# Patient Record
Sex: MALE | Race: WHITE | NOT HISPANIC OR LATINO
[De-identification: names, ages, dates, MRNs, and addresses within clinical notes are randomized per-mention and may not be internally consistent; named-entity substitution may affect disease eponyms.]

---

## 2017-10-19 ENCOUNTER — RECORD ABSTRACTING (OUTPATIENT)
Age: 42
End: 2017-10-19

## 2017-10-19 DIAGNOSIS — Z80.3 FAMILY HISTORY OF MALIGNANT NEOPLASM OF BREAST: ICD-10-CM

## 2017-10-19 DIAGNOSIS — Z83.49 FAMILY HISTORY OF OTHER ENDOCRINE, NUTRITIONAL AND METABOLIC DISEASES: ICD-10-CM

## 2017-10-19 DIAGNOSIS — Z78.9 OTHER SPECIFIED HEALTH STATUS: ICD-10-CM

## 2017-10-19 DIAGNOSIS — Z82.3 FAMILY HISTORY OF STROKE: ICD-10-CM

## 2017-10-19 DIAGNOSIS — Z82.49 FAMILY HISTORY OF ISCHEMIC HEART DISEASE AND OTHER DISEASES OF THE CIRCULATORY SYSTEM: ICD-10-CM

## 2017-11-09 ENCOUNTER — APPOINTMENT (OUTPATIENT)
Dept: FAMILY MEDICINE | Facility: CLINIC | Age: 42
End: 2017-11-09

## 2018-02-01 ENCOUNTER — APPOINTMENT (OUTPATIENT)
Dept: FAMILY MEDICINE | Facility: CLINIC | Age: 43
End: 2018-02-01
Payer: COMMERCIAL

## 2018-02-01 VITALS
WEIGHT: 210 LBS | DIASTOLIC BLOOD PRESSURE: 110 MMHG | BODY MASS INDEX: 32.96 KG/M2 | HEIGHT: 67 IN | SYSTOLIC BLOOD PRESSURE: 150 MMHG

## 2018-02-01 PROCEDURE — 99211 OFF/OP EST MAY X REQ PHY/QHP: CPT

## 2018-02-15 ENCOUNTER — APPOINTMENT (OUTPATIENT)
Dept: FAMILY MEDICINE | Facility: CLINIC | Age: 43
End: 2018-02-15
Payer: COMMERCIAL

## 2018-02-15 VITALS
HEIGHT: 67 IN | WEIGHT: 210 LBS | BODY MASS INDEX: 32.96 KG/M2 | DIASTOLIC BLOOD PRESSURE: 100 MMHG | SYSTOLIC BLOOD PRESSURE: 150 MMHG

## 2018-02-15 DIAGNOSIS — Z82.49 FAMILY HISTORY OF ISCHEMIC HEART DISEASE AND OTHER DISEASES OF THE CIRCULATORY SYSTEM: ICD-10-CM

## 2018-02-15 PROCEDURE — 99213 OFFICE O/P EST LOW 20 MIN: CPT

## 2018-03-15 ENCOUNTER — APPOINTMENT (OUTPATIENT)
Dept: FAMILY MEDICINE | Facility: CLINIC | Age: 43
End: 2018-03-15

## 2018-03-17 ENCOUNTER — APPOINTMENT (OUTPATIENT)
Dept: FAMILY MEDICINE | Facility: CLINIC | Age: 43
End: 2018-03-17

## 2018-08-02 ENCOUNTER — APPOINTMENT (OUTPATIENT)
Dept: FAMILY MEDICINE | Facility: CLINIC | Age: 43
End: 2018-08-02
Payer: COMMERCIAL

## 2018-08-02 VITALS
BODY MASS INDEX: 32.96 KG/M2 | DIASTOLIC BLOOD PRESSURE: 100 MMHG | HEIGHT: 67 IN | WEIGHT: 210 LBS | SYSTOLIC BLOOD PRESSURE: 178 MMHG

## 2018-08-02 LAB — HBA1C MFR BLD: 8.3

## 2018-08-02 PROCEDURE — 99213 OFFICE O/P EST LOW 20 MIN: CPT

## 2018-08-02 RX ORDER — LISINOPRIL AND HYDROCHLOROTHIAZIDE TABLETS 20; 12.5 MG/1; MG/1
20-12.5 TABLET ORAL
Qty: 30 | Refills: 0 | Status: DISCONTINUED | COMMUNITY
End: 2018-08-02

## 2018-08-02 NOTE — PLAN
[FreeTextEntry1] : Stressed the need to take meds every day. Needs to lose weight. Needs to exercise. Decrease carbs and the salt.

## 2018-08-02 NOTE — PHYSICAL EXAM
[No Respiratory Distress] : no respiratory distress  [Clear to Auscultation] : lungs were clear to auscultation bilaterally [No Accessory Muscle Use] : no accessory muscle use [Normal Rate] : normal rate  [Regular Rhythm] : with a regular rhythm [Normal S1, S2] : normal S1 and S2 [No Murmur] : no murmur heard

## 2018-08-02 NOTE — HISTORY OF PRESENT ILLNESS
[Hypertension] : Hypertension [Checks BP Sporadically] : The patient checks ~his/her~ blood pressure sporadically [<140/90] : Target blood pressure is <140/90 [Target goal not met] : BP target goal not met [FreeTextEntry6] : BP check.

## 2018-08-02 NOTE — HEALTH RISK ASSESSMENT
[No falls in past year] : Patient reported no falls in the past year [0] : 2) Feeling down, depressed, or hopeless: Not at all (0) [] : No [de-identified] : rarely

## 2018-08-02 NOTE — COUNSELING
[Weight management counseling provided] : Weight management [Healthy eating counseling provided] : healthy eating [Activity counseling provided] : activity [Behavioral health counseling provided] : behavioral health  [Engage in a relaxing activity] : Engage in a relaxing activity [Patient Non-adherent to care plan] : Patient non-adherent to care plan [Good understanding] : Patient has a good understanding of lifestyle changes and the steps needed to achieve self management goals

## 2018-08-16 ENCOUNTER — APPOINTMENT (OUTPATIENT)
Dept: FAMILY MEDICINE | Facility: CLINIC | Age: 43
End: 2018-08-16

## 2018-11-02 ENCOUNTER — APPOINTMENT (OUTPATIENT)
Dept: FAMILY MEDICINE | Facility: CLINIC | Age: 43
End: 2018-11-02

## 2018-11-12 ENCOUNTER — RX RENEWAL (OUTPATIENT)
Age: 43
End: 2018-11-12

## 2018-11-29 ENCOUNTER — APPOINTMENT (OUTPATIENT)
Dept: FAMILY MEDICINE | Facility: CLINIC | Age: 43
End: 2018-11-29
Payer: COMMERCIAL

## 2018-11-29 VITALS
BODY MASS INDEX: 32.96 KG/M2 | HEIGHT: 67 IN | DIASTOLIC BLOOD PRESSURE: 106 MMHG | SYSTOLIC BLOOD PRESSURE: 142 MMHG | WEIGHT: 210 LBS

## 2018-11-29 PROCEDURE — 99213 OFFICE O/P EST LOW 20 MIN: CPT

## 2018-11-29 NOTE — COUNSELING
[Weight management counseling provided] : Weight management [Healthy eating counseling provided] : healthy eating [Activity counseling provided] : activity [Behavioral health counseling provided] : behavioral health  [Low Salt Diet] : Low salt diet [None] : None [Good understanding] : Patient has a good understanding of lifestyle changes and the steps needed to achieve self management goals

## 2018-11-29 NOTE — HISTORY OF PRESENT ILLNESS
[Hypertension] : Hypertension [Does not check BP] : The patient is not checking blood pressure [<140/90] : Target blood pressure is <140/90 [Target goal not met] : BP target goal not met [FreeTextEntry6] : BP check. and refill of medications

## 2018-12-20 ENCOUNTER — APPOINTMENT (OUTPATIENT)
Dept: FAMILY MEDICINE | Facility: CLINIC | Age: 43
End: 2018-12-20

## 2018-12-27 ENCOUNTER — APPOINTMENT (OUTPATIENT)
Dept: FAMILY MEDICINE | Facility: CLINIC | Age: 43
End: 2018-12-27
Payer: COMMERCIAL

## 2018-12-27 VITALS
DIASTOLIC BLOOD PRESSURE: 86 MMHG | SYSTOLIC BLOOD PRESSURE: 136 MMHG | BODY MASS INDEX: 32.96 KG/M2 | WEIGHT: 210 LBS | HEIGHT: 67 IN

## 2018-12-27 PROCEDURE — G0447 BEHAVIOR COUNSEL OBESITY 15M: CPT

## 2018-12-27 PROCEDURE — 99214 OFFICE O/P EST MOD 30 MIN: CPT | Mod: 25

## 2018-12-27 NOTE — COUNSELING
[Weight management counseling provided] : Weight management [Healthy eating counseling provided] : healthy eating [Activity counseling provided] : activity [Behavioral health counseling provided] : behavioral health  [None] : None [Good understanding] : Patient has a good understanding of lifestyle changes and the steps needed to achieve self management goals [ - Behavioral Counseling for Obesity (Face-to-Face for 15 Minutes)] : Behavioral Counseling for Obesity (Face-to-Face for 15 Minutes)

## 2018-12-27 NOTE — HISTORY OF PRESENT ILLNESS
[Hypertension] : Hypertension [Does not check BP] : The patient is not checking blood pressure [<140/90] : Target blood pressure is <140/90 [FreeTextEntry6] : BP check.

## 2018-12-27 NOTE — HEALTH RISK ASSESSMENT
[No falls in past year] : Patient reported no falls in the past year [0] : 2) Feeling down, depressed, or hopeless: Not at all (0) [] : No [[id number]] : 0

## 2019-03-28 ENCOUNTER — APPOINTMENT (OUTPATIENT)
Dept: FAMILY MEDICINE | Facility: CLINIC | Age: 44
End: 2019-03-28
Payer: COMMERCIAL

## 2019-03-28 VITALS
HEIGHT: 67 IN | WEIGHT: 210 LBS | SYSTOLIC BLOOD PRESSURE: 118 MMHG | DIASTOLIC BLOOD PRESSURE: 80 MMHG | BODY MASS INDEX: 32.96 KG/M2

## 2019-03-28 PROCEDURE — 99213 OFFICE O/P EST LOW 20 MIN: CPT

## 2019-03-28 NOTE — HISTORY OF PRESENT ILLNESS
[Hypertension] : Hypertension [FreeTextEntry6] : BP check. [Checks BP Sporadically] : The patient checks ~his/her~ blood pressure sporadically [<140/90] : Target blood pressure is <140/90 [Near target goal] : BP near target goal

## 2019-03-28 NOTE — HEALTH RISK ASSESSMENT
[] : No [No falls in past year] : Patient reported no falls in the past year [0] : 2) Feeling down, depressed, or hopeless: Not at all (0) [[id number]] : 0

## 2019-07-10 ENCOUNTER — APPOINTMENT (OUTPATIENT)
Dept: FAMILY MEDICINE | Facility: CLINIC | Age: 44
End: 2019-07-10

## 2019-08-08 ENCOUNTER — NON-APPOINTMENT (OUTPATIENT)
Age: 44
End: 2019-08-08

## 2019-08-08 ENCOUNTER — APPOINTMENT (OUTPATIENT)
Dept: FAMILY MEDICINE | Facility: CLINIC | Age: 44
End: 2019-08-08
Payer: COMMERCIAL

## 2019-08-08 VITALS
BODY MASS INDEX: 36.1 KG/M2 | HEIGHT: 67 IN | WEIGHT: 230 LBS | DIASTOLIC BLOOD PRESSURE: 80 MMHG | SYSTOLIC BLOOD PRESSURE: 128 MMHG

## 2019-08-08 LAB
BILIRUB UR QL STRIP: NORMAL
CLARITY UR: CLEAR
COLLECTION METHOD: NORMAL
GLUCOSE UR-MCNC: 0
HCG UR QL: 0.2 EU/DL
HGB UR QL STRIP.AUTO: 0
KETONES UR-MCNC: 0
LEUKOCYTE ESTERASE UR QL STRIP: 0
NITRITE UR QL STRIP: 0
PH UR STRIP: 5.5
PROT UR STRIP-MCNC: 0
SP GR UR STRIP: 1.03

## 2019-08-08 PROCEDURE — 92551 PURE TONE HEARING TEST AIR: CPT

## 2019-08-08 PROCEDURE — 36415 COLL VENOUS BLD VENIPUNCTURE: CPT

## 2019-08-08 PROCEDURE — 99396 PREV VISIT EST AGE 40-64: CPT | Mod: 25

## 2019-08-08 PROCEDURE — 99173 VISUAL ACUITY SCREEN: CPT

## 2019-08-08 PROCEDURE — 93000 ELECTROCARDIOGRAM COMPLETE: CPT

## 2019-08-08 PROCEDURE — 81002 URINALYSIS NONAUTO W/O SCOPE: CPT

## 2019-08-08 NOTE — PHYSICAL EXAM
[20/___] : left eye 20/[unfilled] [FreeTextEntry1] : 500 R 15 L 15   1000 R 20 L 15   2000 R 15 L 15   4000 R 25 L 50

## 2019-08-08 NOTE — COUNSELING
[Fall prevention counseling provided] : Fall prevention counseling provided [Behavioral health counseling provided] : Behavioral health counseling provided

## 2019-08-08 NOTE — HEALTH RISK ASSESSMENT
[0] : 2) Feeling down, depressed, or hopeless: Not at all (0) [Very Good] : ~his/her~  mood as very good [] : No [Yes] : Yes [Monthly or less (1 pt)] : Monthly or less (1 point) [1 or 2 (0 pts)] : 1 or 2 (0 points) [No] : In the past 12 months have you used drugs other than those required for medical reasons? No [Never (0 pts)] : Never (0 points) [No falls in past year] : Patient reported no falls in the past year [[id number]] : 0 [Audit-CScore] : 1 [No Retinopathy] : No retinopathy [EyeExamDate] : 08/19 [HIV test declined] : HIV test declined [Hepatitis C test declined] : Hepatitis C test declined [Language] : denies difficulty with language [Change in mental status noted] : No change in mental status noted [Learning/Retaining New Information] : denies difficulty learning/retaining new information [Reasoning] : denies difficulty with reasoning [Handling Complex Tasks] : denies difficulty handling complex tasks [Spatial Ability and Orientation] : denies difficulty with spatial ability and orientation [None] : None [With Family] : lives with family [Employed] : employed [# Of Children ___] : has [unfilled] children [] :  [High School] : high school [High Risk Behavior] : no high risk behavior [Sexually Active] : sexually active [Fully functional (bathing, dressing, toileting, transferring, walking, feeding)] : Fully functional (bathing, dressing, toileting, transferring, walking, feeding) [Feels Safe at Home] : Feels safe at home [Fully functional (using the telephone, shopping, preparing meals, housekeeping, doing laundry, using] : Fully functional and needs no help or supervision to perform IADLs (using the telephone, shopping, preparing meals, housekeeping, doing laundry, using transportation, managing medications and managing finances) [Reports changes in hearing] : Reports no changes in hearing [Reports changes in dental health] : Reports no changes in dental health [Reports changes in vision] : Reports no changes in vision [Carbon Monoxide Detector] : carbon monoxide detector [Smoke Detector] : smoke detector [Safety elements used in home] : safety elements used in home [Guns at Home] : guns at home [Sunscreen] : does not use sunscreen [Seat Belt] :  uses seat belt [de-identified] : Locked [With Patient/Caregiver] : With Patient/Caregiver [AdvancecareDate] : 08/19 [Designated Healthcare Proxy] : Designated healthcare proxy

## 2019-08-09 LAB
ALBUMIN SERPL ELPH-MCNC: 4.7 G/DL
ALP BLD-CCNC: 49 U/L
ALT SERPL-CCNC: 42 U/L
ANION GAP SERPL CALC-SCNC: 12 MMOL/L
AST SERPL-CCNC: 15 U/L
BASOPHILS # BLD AUTO: 0.09 K/UL
BASOPHILS NFR BLD AUTO: 0.9 %
BILIRUB SERPL-MCNC: 0.4 MG/DL
BUN SERPL-MCNC: 13 MG/DL
CALCIUM SERPL-MCNC: 9.6 MG/DL
CHLORIDE SERPL-SCNC: 104 MMOL/L
CO2 SERPL-SCNC: 25 MMOL/L
CREAT SERPL-MCNC: 1.17 MG/DL
CREAT SPEC-SCNC: 217 MG/DL
EOSINOPHIL # BLD AUTO: 0.21 K/UL
EOSINOPHIL NFR BLD AUTO: 2.2 %
GLUCOSE SERPL-MCNC: 161 MG/DL
HCT VFR BLD CALC: 46.2 %
HGB BLD-MCNC: 15.7 G/DL
IMM GRANULOCYTES NFR BLD AUTO: 0.3 %
LYMPHOCYTES # BLD AUTO: 3.05 K/UL
LYMPHOCYTES NFR BLD AUTO: 31.3 %
MAN DIFF?: NORMAL
MCHC RBC-ENTMCNC: 32 PG
MCHC RBC-ENTMCNC: 34 GM/DL
MCV RBC AUTO: 94.3 FL
MICROALBUMIN 24H UR DL<=1MG/L-MCNC: <1.2 MG/DL
MICROALBUMIN/CREAT 24H UR-RTO: NORMAL MG/G
MONOCYTES # BLD AUTO: 0.79 K/UL
MONOCYTES NFR BLD AUTO: 8.1 %
NEUTROPHILS # BLD AUTO: 5.59 K/UL
NEUTROPHILS NFR BLD AUTO: 57.2 %
PLATELET # BLD AUTO: 242 K/UL
POTASSIUM SERPL-SCNC: 3.9 MMOL/L
PROT SERPL-MCNC: 7.6 G/DL
RBC # BLD: 4.9 M/UL
RBC # FLD: 12.9 %
SODIUM SERPL-SCNC: 141 MMOL/L
WBC # FLD AUTO: 9.76 K/UL

## 2019-08-14 LAB
CHOLEST SERPL-MCNC: 212 MG/DL
CHOLEST/HDLC SERPL: 5.6 RATIO
ESTIMATED AVERAGE GLUCOSE: 214 MG/DL
HBA1C MFR BLD HPLC: 9.1 %
HDLC SERPL-MCNC: 38 MG/DL
LDLC SERPL CALC-MCNC: 154 MG/DL
TRIGL SERPL-MCNC: 102 MG/DL

## 2019-08-30 ENCOUNTER — RX RENEWAL (OUTPATIENT)
Age: 44
End: 2019-08-30

## 2019-10-10 ENCOUNTER — APPOINTMENT (OUTPATIENT)
Dept: FAMILY MEDICINE | Facility: CLINIC | Age: 44
End: 2019-10-10
Payer: COMMERCIAL

## 2019-10-10 VITALS
WEIGHT: 230 LBS | DIASTOLIC BLOOD PRESSURE: 98 MMHG | SYSTOLIC BLOOD PRESSURE: 164 MMHG | HEIGHT: 67 IN | BODY MASS INDEX: 36.1 KG/M2

## 2019-10-10 PROCEDURE — 99214 OFFICE O/P EST MOD 30 MIN: CPT

## 2019-10-10 NOTE — PLAN
[FreeTextEntry1] : Advised at length regarding risks and benefits including life threatening complications and risk of injury. Strongly advised not to use it while flying or driving or operating machinery. Advised I will not prescribe it off label and recommended he stop using it.\par ALSO MUST TAKE BP MEDS!

## 2019-10-10 NOTE — HISTORY OF PRESENT ILLNESS
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 Purchased some off the black market. Liked the way he felt taking it.

## 2019-11-06 ENCOUNTER — APPOINTMENT (OUTPATIENT)
Dept: FAMILY MEDICINE | Facility: CLINIC | Age: 44
End: 2019-11-06

## 2020-04-06 ENCOUNTER — APPOINTMENT (OUTPATIENT)
Dept: FAMILY MEDICINE | Facility: CLINIC | Age: 45
End: 2020-04-06
Payer: COMMERCIAL

## 2020-04-06 PROCEDURE — G2012 BRIEF CHECK IN BY MD/QHP: CPT

## 2020-04-22 ENCOUNTER — APPOINTMENT (OUTPATIENT)
Dept: FAMILY MEDICINE | Facility: CLINIC | Age: 45
End: 2020-04-22

## 2020-04-22 ENCOUNTER — APPOINTMENT (OUTPATIENT)
Dept: FAMILY MEDICINE | Facility: CLINIC | Age: 45
End: 2020-04-22
Payer: COMMERCIAL

## 2020-04-22 PROCEDURE — 99213 OFFICE O/P EST LOW 20 MIN: CPT | Mod: 95

## 2020-04-22 NOTE — PHYSICAL EXAM
[Normal Outer Ear/Nose] : the outer ears and nose were normal in appearance [Normal Sclera/Conjunctiva] : normal sclera/conjunctiva [No JVD] : no jugular venous distention [No Respiratory Distress] : no respiratory distress  [Supple] : supple [Normal] : no joint swelling and grossly normal strength and tone [de-identified] : Unable to examine due to telehealth visit

## 2020-04-22 NOTE — HISTORY OF PRESENT ILLNESS
[FreeTextEntry8] : Pt believes that he and his family all had the symptoms of COVID -19. The patient had an exposure to covid 19\par Pt had diarrhea, fever,  fatigue, weakness, chills, nausea, \par Son had loss of smell and taste. WIfe had cough, and shortness of breath Had a chest xray which was clear with fever.\par At this point most of family is better except for his wife who is 90 % better.\par \par Pt has been checking his BP  occ. last level was 138/89\par

## 2020-04-22 NOTE — PLAN
[FreeTextEntry1] : Advised at length\par \par Advises of criteria for return to work after COVID 19 infection:\par At least 7 days since onset of symptoms\par At least 3 days since resolution of fever without antipyretics\par Substantial improvement of symptoms and cough mostly resolved\par

## 2020-06-12 ENCOUNTER — APPOINTMENT (OUTPATIENT)
Dept: FAMILY MEDICINE | Facility: CLINIC | Age: 45
End: 2020-06-12
Payer: COMMERCIAL

## 2020-06-12 PROCEDURE — 99213 OFFICE O/P EST LOW 20 MIN: CPT | Mod: 95

## 2020-06-12 NOTE — PHYSICAL EXAM
[Normal Sclera/Conjunctiva] : normal sclera/conjunctiva [Normal Outer Ear/Nose] : the outer ears and nose were normal in appearance [No JVD] : no jugular venous distention [Normal] : soft, non-tender, non-distended, no masses palpated, no HSM and normal bowel sounds [No Respiratory Distress] : no respiratory distress  [de-identified] : Unable to examine due to telehealth visit

## 2020-06-12 NOTE — HEALTH RISK ASSESSMENT
[Yes] : Yes [Monthly or less (1 pt)] : Monthly or less (1 point) [No falls in past year] : Patient reported no falls in the past year [1 or 2 (0 pts)] : 1 or 2 (0 points) [0] : 1) Little interest or pleasure doing things: Not at all (0) [] : No [GQC2Pvsqq] : 0

## 2020-06-12 NOTE — HISTORY OF PRESENT ILLNESS
[Medical Office: ([facility])___] : at the medical office located in  [Home] : at home, [unfilled] , at the time of the visit. [Verbal consent obtained from patient] : the patient, [unfilled] [Checks BP Sporadically] : The patient checks ~his/her~ blood pressure sporadically [<140/90] : Target blood pressure is <140/90 [Near target goal] : BP near target goal [FreeTextEntry4] : Marilynn Meng [de-identified] : 523/89

## 2020-10-22 ENCOUNTER — APPOINTMENT (OUTPATIENT)
Dept: FAMILY MEDICINE | Facility: CLINIC | Age: 45
End: 2020-10-22
Payer: COMMERCIAL

## 2020-10-22 VITALS
HEART RATE: 103 BPM | WEIGHT: 230 LBS | TEMPERATURE: 98.2 F | SYSTOLIC BLOOD PRESSURE: 140 MMHG | BODY MASS INDEX: 36.02 KG/M2 | DIASTOLIC BLOOD PRESSURE: 92 MMHG | OXYGEN SATURATION: 97 %

## 2020-10-22 PROCEDURE — 99072 ADDL SUPL MATRL&STAF TM PHE: CPT

## 2020-10-22 PROCEDURE — 99213 OFFICE O/P EST LOW 20 MIN: CPT | Mod: 25

## 2020-10-22 NOTE — PLAN
[FreeTextEntry1] : Advised pt to lose weight\par Decrease salt intake\par Increase exercise\par If unable to decrease numbers again may need to add a third agent.

## 2020-10-22 NOTE — HISTORY OF PRESENT ILLNESS
[Hypertension] : Hypertension [FreeTextEntry6] : pt is here for bp check. [<140/90] : Target blood pressure is <140/90 [Target goal met] : BP target goal met

## 2020-12-17 ENCOUNTER — APPOINTMENT (OUTPATIENT)
Dept: FAMILY MEDICINE | Facility: CLINIC | Age: 45
End: 2020-12-17

## 2021-02-04 ENCOUNTER — APPOINTMENT (OUTPATIENT)
Dept: FAMILY MEDICINE | Facility: CLINIC | Age: 46
End: 2021-02-04
Payer: COMMERCIAL

## 2021-02-04 ENCOUNTER — LABORATORY RESULT (OUTPATIENT)
Age: 46
End: 2021-02-04

## 2021-02-04 ENCOUNTER — NON-APPOINTMENT (OUTPATIENT)
Age: 46
End: 2021-02-04

## 2021-02-04 VITALS
WEIGHT: 230 LBS | OXYGEN SATURATION: 97 % | HEART RATE: 103 BPM | HEIGHT: 67 IN | SYSTOLIC BLOOD PRESSURE: 154 MMHG | TEMPERATURE: 97.2 F | BODY MASS INDEX: 36.1 KG/M2 | DIASTOLIC BLOOD PRESSURE: 98 MMHG

## 2021-02-04 PROCEDURE — 99072 ADDL SUPL MATRL&STAF TM PHE: CPT

## 2021-02-04 PROCEDURE — 36415 COLL VENOUS BLD VENIPUNCTURE: CPT

## 2021-02-04 PROCEDURE — 99214 OFFICE O/P EST MOD 30 MIN: CPT | Mod: 25

## 2021-02-04 NOTE — PLAN
[FreeTextEntry1] : Wt loss\par low sodium diet\par Exercise \par low carb\par Low fat\par Extensive discussion re above and covid vaccination

## 2021-02-05 LAB
ALBUMIN SERPL ELPH-MCNC: 4.4 G/DL
ALP BLD-CCNC: 67 U/L
ALT SERPL-CCNC: 136 U/L
ANION GAP SERPL CALC-SCNC: 16 MMOL/L
AST SERPL-CCNC: 109 U/L
BILIRUB SERPL-MCNC: 0.4 MG/DL
BUN SERPL-MCNC: 15 MG/DL
CALCIUM SERPL-MCNC: 10 MG/DL
CHLORIDE SERPL-SCNC: 101 MMOL/L
CHOLEST SERPL-MCNC: 203 MG/DL
CO2 SERPL-SCNC: 23 MMOL/L
CREAT SERPL-MCNC: 1.07 MG/DL
ESTIMATED AVERAGE GLUCOSE: 203 MG/DL
GLUCOSE SERPL-MCNC: 164 MG/DL
HBA1C MFR BLD HPLC: 8.7 %
HDLC SERPL-MCNC: 48 MG/DL
LDLC SERPL CALC-MCNC: 128 MG/DL
NONHDLC SERPL-MCNC: 155 MG/DL
POTASSIUM SERPL-SCNC: 3.8 MMOL/L
PROT SERPL-MCNC: 7.3 G/DL
SODIUM SERPL-SCNC: 140 MMOL/L
TRIGL SERPL-MCNC: 137 MG/DL

## 2021-02-09 ENCOUNTER — NON-APPOINTMENT (OUTPATIENT)
Age: 46
End: 2021-02-09

## 2021-03-04 ENCOUNTER — APPOINTMENT (OUTPATIENT)
Dept: FAMILY MEDICINE | Facility: CLINIC | Age: 46
End: 2021-03-04
Payer: COMMERCIAL

## 2021-03-04 VITALS
BODY MASS INDEX: 36.1 KG/M2 | TEMPERATURE: 97.5 F | WEIGHT: 230 LBS | DIASTOLIC BLOOD PRESSURE: 97 MMHG | SYSTOLIC BLOOD PRESSURE: 157 MMHG | HEART RATE: 100 BPM | HEIGHT: 67 IN | OXYGEN SATURATION: 97 %

## 2021-03-04 PROCEDURE — 99213 OFFICE O/P EST LOW 20 MIN: CPT

## 2021-03-04 PROCEDURE — 99072 ADDL SUPL MATRL&STAF TM PHE: CPT

## 2021-03-04 RX ORDER — VALSARTAN AND HYDROCHLOROTHIAZIDE 320; 25 MG/1; MG/1
320-25 TABLET, FILM COATED ORAL DAILY
Qty: 90 | Refills: 0 | Status: DISCONTINUED | COMMUNITY
Start: 2018-02-15 | End: 2021-03-04

## 2021-03-04 NOTE — HISTORY OF PRESENT ILLNESS
[Hypertension] : Hypertension [FreeTextEntry6] : pt is here for bp check.\par Stopped the valsartan because he red on line that it causes jaundice.\par Failed FAA exam. [Checks BP Regularly] : The patient checks ~his/her~ blood pressure regularly [de-identified] : 138-150/84-93

## 2021-03-04 NOTE — HEALTH RISK ASSESSMENT
[] : No [No] : No [Never (0 pts)] : Never (0 points) [No falls in past year] : Patient reported no falls in the past year [0] : 2) Feeling down, depressed, or hopeless: Not at all (0)

## 2021-03-04 NOTE — COUNSELING
[Fall prevention counseling provided] : Fall prevention counseling provided [Behavioral health counseling provided] : Behavioral health counseling provided [FreeTextEntry2] : Low Fat, Low Cholesterol Diet\par Low Sodium\par \par

## 2021-03-25 ENCOUNTER — APPOINTMENT (OUTPATIENT)
Dept: FAMILY MEDICINE | Facility: CLINIC | Age: 46
End: 2021-03-25

## 2021-06-28 ENCOUNTER — APPOINTMENT (OUTPATIENT)
Dept: FAMILY MEDICINE | Facility: CLINIC | Age: 46
End: 2021-06-28
Payer: COMMERCIAL

## 2021-06-28 PROCEDURE — 99213 OFFICE O/P EST LOW 20 MIN: CPT | Mod: 95

## 2021-06-28 NOTE — PLAN
[FreeTextEntry1] : Tylenol prn pain\par Start rehab exercises\par Offered PT pt. refused. States he know how to rehab wrist.\par F/u if symptoms worsen or persist.

## 2021-06-28 NOTE — HISTORY OF PRESENT ILLNESS
[Home] : at home, [unfilled] , at the time of the visit. [Medical Office: ([facility])___] : at the medical office located in  [Verbal consent obtained from patient] : the patient, [unfilled] [FreeTextEntry4] : Toma Woodson [FreeTextEntry8] : 3 weeks ago hurt wrist.  Was lifting an air conditioner. Felt/herd a pop to the wrist. \par Did not get any echymosis or swelling. Had some point tenderness at first. Now back to FROM and minimal residual pain. No weakness. No paresthesias. No radiation of the pain. No point tenderness

## 2021-06-28 NOTE — PHYSICAL EXAM
[Normal] : no acute distress, well nourished, well developed and well-appearing [No Joint Swelling] : no joint swelling [Grossly Normal Strength/Tone] : grossly normal strength/tone [de-identified] : no point tenderness. FROM no pain against resistance. No swelling No parestesias. No limitation. No neuro deficits.

## 2021-07-26 ENCOUNTER — EMERGENCY (EMERGENCY)
Facility: HOSPITAL | Age: 46
LOS: 0 days | Discharge: HOME | End: 2021-07-26
Attending: EMERGENCY MEDICINE
Payer: COMMERCIAL

## 2021-07-26 ENCOUNTER — APPOINTMENT (OUTPATIENT)
Dept: FAMILY MEDICINE | Facility: CLINIC | Age: 46
End: 2021-07-26
Payer: COMMERCIAL

## 2021-07-26 VITALS
SYSTOLIC BLOOD PRESSURE: 159 MMHG | DIASTOLIC BLOOD PRESSURE: 87 MMHG | WEIGHT: 238 LBS | BODY MASS INDEX: 37.35 KG/M2 | HEIGHT: 67 IN

## 2021-07-26 VITALS — DIASTOLIC BLOOD PRESSURE: 83 MMHG | SYSTOLIC BLOOD PRESSURE: 159 MMHG | HEART RATE: 110 BPM

## 2021-07-26 VITALS — WEIGHT: 250 LBS | HEIGHT: 67 IN

## 2021-07-26 DIAGNOSIS — L02.416 CUTANEOUS ABSCESS OF LEFT LOWER LIMB: ICD-10-CM

## 2021-07-26 DIAGNOSIS — I10 ESSENTIAL (PRIMARY) HYPERTENSION: ICD-10-CM

## 2021-07-26 DIAGNOSIS — R00.0 TACHYCARDIA, UNSPECIFIED: ICD-10-CM

## 2021-07-26 PROCEDURE — 99213 OFFICE O/P EST LOW 20 MIN: CPT | Mod: 95

## 2021-07-26 PROCEDURE — 99284 EMERGENCY DEPT VISIT MOD MDM: CPT

## 2021-07-26 PROCEDURE — 99283 EMERGENCY DEPT VISIT LOW MDM: CPT

## 2021-07-26 RX ORDER — HYDROCHLOROTHIAZIDE 25 MG
1 TABLET ORAL
Qty: 7 | Refills: 0
Start: 2021-07-26

## 2021-07-26 NOTE — ED PROVIDER NOTE - CARE PROVIDER_API CALL
Cornelius Rivera)  Family Medicine  [address], [address], [place] [address]  Phone: [phone]  Fax: [phone]  Follow Up Time: 1-3 Days

## 2021-07-26 NOTE — ED PROVIDER NOTE - PATIENT PORTAL LINK FT
You can access the FollowMyHealth Patient Portal offered by [facility] by registering at the following website: [email or web address]. By joining [facility]’s FollowMyHealth portal, you will also be able to view your health information using other applications (apps) compatible with our system.

## 2021-07-26 NOTE — ED PROVIDER NOTE - PHYSICAL EXAMINATION
Constitutional: NAD AAOx3  Eyes: PERRLA EOMI  Head: Normocephalic atraumatic  Mouth: MMM  Cardiac: regular rate, b/l symmetrical pulses  Resp: Lungs CTAB  GI: Abd s/nt/nd, no rebound or guarding.  Neuro: awake, alert, moving all extremities, cranial nerves 2-12 intact, sensation intact, no dysmetria.  Skin: undersurface of left thigh with 4 cm abscess, indurated with surrounding cellulitis with overlying scab

## 2021-07-26 NOTE — ED PROVIDER NOTE - NS ED ROS FT
Constitutional: No fever or chills  Eyes: No visual changes  HEENT: No throat pain  CV: No chest pain  Resp: No SOB no cough  GI: No abd pain, nausea or vomiting  : No dysuria  MSK: No musculoskeletal pain  Skin: + abscess  Neuro: No headache

## 2021-07-26 NOTE — ED PROVIDER NOTE - NSFOLLOWUPINSTRUCTIONS_ED_ALL_ED_FT
please take hydrochlorothiazide as prescribed and call dr taylor today for appointment. return for chest pain, sob or any concerning symptoms    Hypertension, Adult      High blood pressure (hypertension) is when the force of blood pumping through the arteries is too strong. The arteries are the blood vessels that carry blood from the heart throughout the body. Hypertension forces the heart to work harder to pump blood and may cause arteries to become narrow or stiff. Untreated or uncontrolled hypertension can cause a heart attack, heart failure, a stroke, kidney disease, and other problems.    A blood pressure reading consists of a higher number over a lower number. Ideally, your blood pressure should be below 120/80. The first ("top") number is called the systolic pressure. It is a measure of the pressure in your arteries as your heart beats. The second ("bottom") number is called the diastolic pressure. It is a measure of the pressure in your arteries as the heart relaxes.      What are the causes?    The exact cause of this condition is not known. There are some conditions that result in or are related to high blood pressure.      What increases the risk?  Some risk factors for high blood pressure are under your control. The following factors may make you more likely to develop this condition:  •Smoking.      •Having type 2 diabetes mellitus, high cholesterol, or both.      •Not getting enough exercise or physical activity.      •Being overweight.      •Having too much fat, sugar, calories, or salt (sodium) in your diet.      •Drinking too much alcohol.    Some risk factors for high blood pressure may be difficult or impossible to change. Some of these factors include:  •Having chronic kidney disease.      •Having a family history of high blood pressure.      •Age. Risk increases with age.      •Race. You may be at higher risk if you are .      •Gender. Men are at higher risk than women before age 45. After age 65, women are at higher risk than men.      •Having obstructive sleep apnea.      •Stress.        What are the signs or symptoms?  High blood pressure may not cause symptoms. Very high blood pressure (hypertensive crisis) may cause:  •Headache.      •Anxiety.      •Shortness of breath.      •Nosebleed.      •Nausea and vomiting.      •Vision changes.      •Severe chest pain.      •Seizures.        How is this diagnosed?    This condition is diagnosed by measuring your blood pressure while you are seated, with your arm resting on a flat surface, your legs uncrossed, and your feet flat on the floor. The cuff of the blood pressure monitor will be placed directly against the skin of your upper arm at the level of your heart. It should be measured at least twice using the same arm. Certain conditions can cause a difference in blood pressure between your right and left arms.  Certain factors can cause blood pressure readings to be lower or higher than normal for a short period of time:  •When your blood pressure is higher when you are in a health care provider's office than when you are at home, this is called white coat hypertension. Most people with this condition do not need medicines.      •When your blood pressure is higher at home than when you are in a health care provider's office, this is called masked hypertension. Most people with this condition may need medicines to control blood pressure.    If you have a high blood pressure reading during one visit or you have normal blood pressure with other risk factors, you may be asked to:  •Return on a different day to have your blood pressure checked again.      •Monitor your blood pressure at home for 1 week or longer.      If you are diagnosed with hypertension, you may have other blood or imaging tests to help your health care provider understand your overall risk for other conditions.      How is this treated?  This condition is treated by making healthy lifestyle changes, such as eating healthy foods, exercising more, and reducing your alcohol intake. Your health care provider may prescribe medicine if lifestyle changes are not enough to get your blood pressure under control, and if:  •Your systolic blood pressure is above 130.      •Your diastolic blood pressure is above 80.      Your personal target blood pressure may vary depending on your medical conditions, your age, and other factors.      Follow these instructions at home:      Eating and drinking    •Eat a diet that is high in fiber and potassium, and low in sodium, added sugar, and fat. An example eating plan is called the DASH (Dietary Approaches to Stop Hypertension) diet. To eat this way:  •Eat plenty of fresh fruits and vegetables. Try to fill one half of your plate at each meal with fruits and vegetables.      •Eat whole grains, such as whole-wheat pasta, brown rice, or whole-grain bread. Fill about one fourth of your plate with whole grains.      •Eat or drink low-fat dairy products, such as skim milk or low-fat yogurt.      •Avoid fatty cuts of meat, processed or cured meats, and poultry with skin. Fill about one fourth of your plate with lean proteins, such as fish, chicken without skin, beans, eggs, or tofu.      •Avoid pre-made and processed foods. These tend to be higher in sodium, added sugar, and fat.        •Reduce your daily sodium intake. Most people with hypertension should eat less than 1,500 mg of sodium a day.    • Do not drink alcohol if:  •Your health care provider tells you not to drink.      •You are pregnant, may be pregnant, or are planning to become pregnant.      •If you drink alcohol:•Limit how much you use to:  •0–1 drink a day for women.      •0–2 drinks a day for men.        •Be aware of how much alcohol is in your drink. In the [place] one drink equals one 12 oz bottle of beer (355 mL), one 5 oz glass of wine (148 mL), or one 1½ oz glass of hard liquor (44 mL).          Lifestyle      •Work with your health care provider to maintain a healthy body weight or to lose weight. Ask what an ideal weight is for you.      •Get at least 30 minutes of exercise most days of the week. Activities may include walking, swimming, or biking.      •Include exercise to strengthen your muscles (resistance exercise), such as Pilates or lifting weights, as part of your weekly exercise routine. Try to do these types of exercises for 30 minutes at least 3 days a week.      • Do not use any products that contain nicotine or tobacco, such as cigarettes, e-cigarettes, and chewing tobacco. If you need help quitting, ask your health care provider.      •Monitor your blood pressure at home as told by your health care provider.      •Keep all follow-up visits as told by your health care provider. This is important.      Medicines     •Take over-the-counter and prescription medicines only as told by your health care provider. Follow directions carefully. Blood pressure medicines must be taken as prescribed.      • Do not skip doses of blood pressure medicine. Doing this puts you at risk for problems and can make the medicine less effective.      •Ask your health care provider about side effects or reactions to medicines that you should watch for.        Contact a health care provider if you:    •Think you are having a reaction to a medicine you are taking.      •Have headaches that keep coming back (recurring).      •Feel dizzy.      •Have swelling in your ankles.      •Have trouble with your vision.        Get help right away if you:    •Develop a severe headache or confusion.      •Have unusual weakness or numbness.      •Feel faint.      •Have severe pain in your chest or abdomen.      •Vomit repeatedly.      •Have trouble breathing.        Summary    •Hypertension is when the force of blood pumping through your arteries is too strong. If this condition is not controlled, it may put you at risk for serious complications.      •Your personal target blood pressure may vary depending on your medical conditions, your age, and other factors. For most people, a normal blood pressure is less than 120/80.      •Hypertension is treated with lifestyle changes, medicines, or a combination of both. Lifestyle changes include losing weight, eating a healthy, low-sodium diet, exercising more, and limiting alcohol.      This information is not intended to replace advice given to you by your health care provider. Make sure you discuss any questions you have with your health care provider.      Document Revised: 08/28/2019 Document Reviewed: 08/28/2019    [employer] Patient Education © 2021 [employer]

## 2021-07-26 NOTE — ED PROVIDER NOTE - OBJECTIVE STATEMENT
46 yo m with left thigh abscess x  days.  patient states he has had these before, no fever, chills. no trauma or sick contacts.  he states he would like to try antibiotics rather than I&D.  patient also has hypertensin that his pcp dr taylor prescribed valsartan but he is not taking as he is trying diet and exercise.  no chest pain or sob.

## 2021-07-26 NOTE — HISTORY OF PRESENT ILLNESS
[Home] : at home, [unfilled] , at the time of the visit. [Medical Office: ([facility])___] : at the medical office located in  [Verbal consent obtained from patient] : the patient, [unfilled] [FreeTextEntry4] : Bettina Meng [FreeTextEntry6] : Stoped taking the amlodipine  because he was worried about his liver\par They gave him a 7 days supply of HCTZ

## 2021-11-30 PROBLEM — I10 ESSENTIAL (PRIMARY) HYPERTENSION: Chronic | Status: ACTIVE | Noted: 2021-07-26

## 2021-12-01 ENCOUNTER — APPOINTMENT (OUTPATIENT)
Dept: FAMILY MEDICINE | Facility: CLINIC | Age: 46
End: 2021-12-01
Payer: COMMERCIAL

## 2021-12-01 PROCEDURE — ZZZZZ: CPT

## 2021-12-01 PROCEDURE — 99213 OFFICE O/P EST LOW 20 MIN: CPT | Mod: 95

## 2021-12-01 NOTE — HISTORY OF PRESENT ILLNESS
[Other Location: e.g. School (Enter Location, City,State)___] : at [unfilled], at the time of the visit. [Medical Office: ([facility])___] : at the medical office located in  [Verbal consent obtained from patient] : the patient, [unfilled] [FreeTextEntry4] : Bettina Meng [Cold Symptoms] : cold symptoms [Moderate] : moderate [___ Days ago] : [unfilled] days ago [Cough] : cough [Sore Throat] : sore throat [Wheezing] : no wheezing [Chills] : chills [Anorexia] : anorexia [Shortness Of Breath] : no shortness of breath [Earache] : no earache [Fatigue] : fatigue [Headache] : no headache [Fever] : fever [NSAIDs] : NSAIDs [Worsening] : worsening [de-identified] : Severe myalgia [FreeTextEntry2] : Loss of smell and taste [FreeTextEntry8] : On Thanksgiving began to have cold symptoms\par Two co-workers tested positive. Did not get the covid vaccine

## 2021-12-01 NOTE — PHYSICAL EXAM
[Normal] : no acute distress, well nourished, well developed and well-appearing [Normal Sclera/Conjunctiva] : normal sclera/conjunctiva [Normal Outer Ear/Nose] : the outer ears and nose were normal in appearance [No JVD] : no jugular venous distention [No Respiratory Distress] : no respiratory distress  [de-identified] : Unable to examine due to telehealth visit

## 2021-12-01 NOTE — PLAN
[FreeTextEntry1] : Advised at length re suspected covid\par Contact precautions\par Fluids\par Rest\par f/u after results of test.

## 2021-12-01 NOTE — REVIEW OF SYSTEMS
[Nausea] : no nausea [Vomiting] : no vomiting [Muscle Pain] : muscle pain [Negative] : Genitourinary

## 2021-12-02 ENCOUNTER — APPOINTMENT (OUTPATIENT)
Dept: FAMILY MEDICINE | Facility: CLINIC | Age: 46
End: 2021-12-02
Payer: COMMERCIAL

## 2021-12-02 VITALS
TEMPERATURE: 101 F | WEIGHT: 240 LBS | DIASTOLIC BLOOD PRESSURE: 68 MMHG | HEIGHT: 67 IN | SYSTOLIC BLOOD PRESSURE: 132 MMHG | BODY MASS INDEX: 37.67 KG/M2

## 2021-12-02 DIAGNOSIS — J06.9 ACUTE UPPER RESPIRATORY INFECTION, UNSPECIFIED: ICD-10-CM

## 2021-12-02 PROCEDURE — 99213 OFFICE O/P EST LOW 20 MIN: CPT | Mod: 25

## 2021-12-02 PROCEDURE — 87804 INFLUENZA ASSAY W/OPTIC: CPT | Mod: QW

## 2021-12-02 NOTE — PLAN
[FreeTextEntry1] : Rapid flu\par Get plenty of rest\par Drink plenty of fluids\par Tylenol or Advil as needed for pain or fever\par F/u if not improving or if symptoms worsen\par Gargle with warm salt water\par Chloraseptic spray as needed for sore throat\par Drink warm liquids\par vaporizer or humidifier\par

## 2021-12-02 NOTE — HISTORY OF PRESENT ILLNESS
[FreeTextEntry8] : possible flu,\par C/o mild cough, fever, chills, myalgias\par Had covid test negative.\par C/o since [employer]\par Has covid contacts.\par Mild sore throat

## 2021-12-03 ENCOUNTER — TRANSCRIPTION ENCOUNTER (OUTPATIENT)
Age: 46
End: 2021-12-03

## 2021-12-04 ENCOUNTER — OUTPATIENT (OUTPATIENT)
Dept: OUTPATIENT SERVICES | Facility: HOSPITAL | Age: 46
LOS: 1 days | End: 2021-12-04
Payer: COMMERCIAL

## 2021-12-04 ENCOUNTER — TRANSCRIPTION ENCOUNTER (OUTPATIENT)
Age: 46
End: 2021-12-04

## 2021-12-04 ENCOUNTER — APPOINTMENT (OUTPATIENT)
Dept: DISASTER EMERGENCY | Facility: HOSPITAL | Age: 46
End: 2021-12-04

## 2021-12-04 ENCOUNTER — EMERGENCY (EMERGENCY)
Facility: HOSPITAL | Age: 46
LOS: 0 days | Discharge: HOME | End: 2021-12-04
Attending: EMERGENCY MEDICINE
Payer: COMMERCIAL

## 2021-12-04 VITALS
WEIGHT: 238.1 LBS | TEMPERATURE: 99 F | HEART RATE: 110 BPM | DIASTOLIC BLOOD PRESSURE: 81 MMHG | OXYGEN SATURATION: 92 % | RESPIRATION RATE: 18 BRPM | HEIGHT: 67 IN | SYSTOLIC BLOOD PRESSURE: 144 MMHG

## 2021-12-04 VITALS
TEMPERATURE: 99 F | HEART RATE: 93 BPM | DIASTOLIC BLOOD PRESSURE: 78 MMHG | RESPIRATION RATE: 18 BRPM | OXYGEN SATURATION: 92 % | SYSTOLIC BLOOD PRESSURE: 126 MMHG

## 2021-12-04 VITALS
TEMPERATURE: 100 F | RESPIRATION RATE: 18 BRPM | SYSTOLIC BLOOD PRESSURE: 133 MMHG | DIASTOLIC BLOOD PRESSURE: 81 MMHG | HEART RATE: 114 BPM | OXYGEN SATURATION: 93 %

## 2021-12-04 VITALS — WEIGHT: 238.1 LBS | HEIGHT: 67 IN

## 2021-12-04 DIAGNOSIS — R50.9 FEVER, UNSPECIFIED: ICD-10-CM

## 2021-12-04 DIAGNOSIS — U07.1 COVID-19: ICD-10-CM

## 2021-12-04 DIAGNOSIS — R05.9 COUGH, UNSPECIFIED: ICD-10-CM

## 2021-12-04 DIAGNOSIS — M79.10 MYALGIA, UNSPECIFIED SITE: ICD-10-CM

## 2021-12-04 DIAGNOSIS — I10 ESSENTIAL (PRIMARY) HYPERTENSION: ICD-10-CM

## 2021-12-04 PROCEDURE — M0243: CPT

## 2021-12-04 PROCEDURE — 99284 EMERGENCY DEPT VISIT MOD MDM: CPT

## 2021-12-04 PROCEDURE — 99282 EMERGENCY DEPT VISIT SF MDM: CPT

## 2021-12-04 RX ORDER — SODIUM CHLORIDE 9 MG/ML
250 INJECTION INTRAMUSCULAR; INTRAVENOUS; SUBCUTANEOUS
Refills: 0 | Status: COMPLETED | OUTPATIENT
Start: 2021-12-04 | End: 2021-12-04

## 2021-12-04 RX ADMIN — SODIUM CHLORIDE 310 MILLILITER(S): 9 INJECTION INTRAMUSCULAR; INTRAVENOUS; SUBCUTANEOUS at 08:21

## 2021-12-04 NOTE — CHART NOTE - NSCHARTNOTEFT_GEN_A_CORE
I have reviewed the  Casirivimab/Imdevimab Emergency Use Authorization (EAU) and I have provided the patient or patient's caregiver with the following information:  1. FDA has authorized emergency use of Casirivimab/Imdevimab , which is not FDA-approved biologic product.  2. The patient or patient's caregiver has the option to accept or refuse administration of Casirivimab  3. The significant risks and benefits are unknown.  4. Information on available alternative treatments and risks and benefits of those alternatives.    Discharge:  T(C): 37.1 (12-04-21 @ 09:41), Max: 37.5 (12-04-21 @ 02:53)  HR: 93 (12-04-21 @ 09:41) (93 - 114)  BP: 126/78 (12-04-21 @ 09:41) (114/70 - 144/81)  RR: 18 (12-04-21 @ 09:41) (18 - 18)  SpO2: 92% (12-04-21 @ 09:41) (92% - 94%)  Patient tolerated infusion well denies complaints of chest pain/SOB/dizzines/ palps  VSS for discharge home  D/C instructions given/ fact sheet included.  Patient to follow-up with PCP as needed

## 2021-12-04 NOTE — CHART NOTE - SYMPTOMS
Fever/Cough/Sore Throa/Malaise/Headache/Muscle Pain not due a chronic condition/GI symptoms/Loss of taste / smell

## 2021-12-04 NOTE — ED ADULT TRIAGE NOTE - CHIEF COMPLAINT QUOTE
covid positive diagnosed yesterday. worsening cough throughout evening. spoke to PMD justine taylor, recommended MAB treatment.   brought to ED by wife sima [phone].

## 2021-12-04 NOTE — ED ADULT NURSE NOTE - CHIEF COMPLAINT QUOTE
covid positive diagnosed yesterday. worsening cough throughout evening. spoke to PMD justine taylor, recommended MAB treatment.   brought to ED by wife sima [phone].

## 2021-12-04 NOTE — ED PROVIDER NOTE - OBJECTIVE STATEMENT
45 y/o male diagnosed with COVID yesterday and has appt today at 0800 at [facility] for MAB infusion in ED requesting to have MAB now.   pt states fever, cough, myalgia.   tolerating PO.   states not vaccinated

## 2021-12-04 NOTE — ED PROVIDER NOTE - NSFOLLOWUPINSTRUCTIONS_ED_ALL_ED_FT
please follow up at [facility] at 8AM for you Monoclonal Ab as scheduled.   take tylenol for fever and pain.  drink plenty of fluids.   return to ED for any concerns

## 2021-12-04 NOTE — ED PROVIDER NOTE - CLINICAL SUMMARY MEDICAL DECISION MAKING FREE TEXT BOX
pt with COVID and has appt at 0800 at [facility] for MAB in ED requesting to have MAB now.   will d/c and have f/u at [facility] at 0800 as scheduled

## 2021-12-04 NOTE — CHART NOTE - NSCHARTNOTEFT_GEN_A_CORE
CC: Monoclonal Antibody Infusion/COVID 19 Positive      History: Patient presents for infusion of monoclonal antibody infusion. Patient has been screened and was deemed to be a candidate.    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    Symptoms/ Criteria: fever cough headache body aches malaise    Inclusion Criteria: HTN BMI > 35     Date of Positive Test: verified by clinical call center     Date of symptom onset: 11/30    Vaccine status: unvaccinated    PMHx:  Infection due to severe acute respiratory syndrome coronavirus 2 (SARS-CoV-2)    MEWS Score    Hypertension          T(C): 37.3 (12-04-21 @ 08:41), Max: 37.5 (12-04-21 @ 02:53)  HR: 99 (12-04-21 @ 08:41) (99 - 114)  BP: 116/74 (12-04-21 @ 08:41) (114/70 - 144/81)  RR: 18 (12-04-21 @ 08:41) (18 - 18)  SpO2: 94% (12-04-21 @ 08:41) (92% - 94%)      PE:   Appearance: NAD	  HEENT:   Normal oral mucosa.   Lymphatic: No lymphadenopathy  Cardiovascular: Normal S1 S2, No JVD, No murmurs, No edema  Respiratory: Lungs clear to auscultation	  Gastrointestinal:  Soft, Non-tender. No guarding   Skin: warm and dry  Neurologic: Non-focal  Extremities: Normal range of motion.     ASSESSMENT:  Pt is a 46y year old Male with PMH HTN BMI > 35  Covid +  referred to the infusion center for Monoclonal antibody infusion (Regeneron).        PLAN:  - infusion procedure explained to patient   - Consent for monoclonal antibody infusion obtained   - Risk & benefits discussed/all questions answered  - infuse Regeneron per protocol  - will observe patient for one hour post infusion  and then if stable discharge home with outpt follow up as planned by PMD.        - Patient tolerated infusion well denies complaints of chest pain/SOB/dizziness/ palpitations  - VSS for discharge home  - D/C instructions given/ fact sheet included.  - Patient to follow-up with PCP as needed.

## 2021-12-04 NOTE — ED PROVIDER NOTE - PATIENT PORTAL LINK FT
You can access the FollowMyHealth Patient Portal offered by [facility] by registering at the following website: [email or web address]. By joining [employer]’s FollowMyHealth portal, you will also be able to view your health information using other applications (apps) compatible with our system.

## 2021-12-05 ENCOUNTER — TRANSCRIPTION ENCOUNTER (OUTPATIENT)
Age: 46
End: 2021-12-05

## 2021-12-06 ENCOUNTER — NON-APPOINTMENT (OUTPATIENT)
Age: 46
End: 2021-12-06

## 2021-12-23 ENCOUNTER — APPOINTMENT (OUTPATIENT)
Dept: FAMILY MEDICINE | Facility: CLINIC | Age: 46
End: 2021-12-23
Payer: COMMERCIAL

## 2021-12-23 PROCEDURE — 99213 OFFICE O/P EST LOW 20 MIN: CPT | Mod: 95

## 2021-12-23 RX ORDER — SULFAMETHOXAZOLE AND TRIMETHOPRIM 800; 160 MG/1; MG/1
800-160 TABLET ORAL
Qty: 14 | Refills: 0 | Status: COMPLETED | COMMUNITY
Start: 2021-12-23 | End: 2021-12-30

## 2021-12-23 NOTE — PHYSICAL EXAM
[Normal] : no acute distress, well nourished, well developed and well-appearing [Normal Sclera/Conjunctiva] : normal sclera/conjunctiva [Normal Outer Ear/Nose] : the outer ears and nose were normal in appearance [No JVD] : no jugular venous distention [No Respiratory Distress] : no respiratory distress  [de-identified] : .santi [de-identified] : 2 mild abcesses to thighs

## 2021-12-23 NOTE — HISTORY OF PRESENT ILLNESS
[Home] : at home, [unfilled] , at the time of the visit. [Medical Office: ([facility])___] : at the medical office located in  [Verbal consent obtained from patient] : the patient, [unfilled] [FreeTextEntry4] : Bettina Meng [FreeTextEntry8] : Pt is s/p covid and monoclonal antibody. Received the infusion on 12/6/21\par Pt states got much better \par Thinks it saved his life. Three days after the antibody Tx. \par At the end of covid had an abcess to buttocks. Put oinmtent on it. After a week it poped and painful\par Now it has been a week and now has two more spots on inner thigh

## 2021-12-30 ENCOUNTER — NON-APPOINTMENT (OUTPATIENT)
Age: 46
End: 2021-12-30

## 2022-11-20 ENCOUNTER — EMERGENCY (EMERGENCY)
Facility: HOSPITAL | Age: 47
LOS: 0 days | Discharge: HOME | End: 2022-11-20
Attending: EMERGENCY MEDICINE
Payer: COMMERCIAL

## 2022-11-20 VITALS
HEART RATE: 98 BPM | DIASTOLIC BLOOD PRESSURE: 91 MMHG | TEMPERATURE: 98 F | OXYGEN SATURATION: 98 % | RESPIRATION RATE: 20 BRPM | SYSTOLIC BLOOD PRESSURE: 140 MMHG

## 2022-11-20 VITALS — HEIGHT: 67 IN | WEIGHT: 229.94 LBS

## 2022-11-20 DIAGNOSIS — I10 ESSENTIAL (PRIMARY) HYPERTENSION: ICD-10-CM

## 2022-11-20 DIAGNOSIS — L02.31 CUTANEOUS ABSCESS OF BUTTOCK: ICD-10-CM

## 2022-11-20 DIAGNOSIS — R73.03 PREDIABETES: ICD-10-CM

## 2022-11-20 DIAGNOSIS — L03.90 CELLULITIS, UNSPECIFIED: ICD-10-CM

## 2022-11-20 PROCEDURE — 99283 EMERGENCY DEPT VISIT LOW MDM: CPT

## 2022-11-20 NOTE — ED PROVIDER NOTE - PATIENT PORTAL LINK FT
You can access the FollowMyHealth Patient Portal offered by [facility] by registering at the following website: [email or web address]. By joining [employer]’s FollowMyHealth portal, you will also be able to view your health information using other applications (apps) compatible with our system.

## 2022-11-20 NOTE — ED PROVIDER NOTE - NS ED ROS FT
Constitutional: No fever or chills  Eyes: No visual changes  HEENT: No throat pain  CV: No chest pain  Resp: No SOB no cough  GI: No abd pain, nausea or vomiting  : No dysuria  MSK: No musculoskeletal pain  Skin: + healing abscess  Neuro: No headache   psych: anxious (severe hospital anxiety)

## 2022-11-20 NOTE — ED PROVIDER NOTE - OBJECTIVE STATEMENT
47-year-old male history of hypertension prediabetes presents the emergency department for abscess to left gluteus.  Patient states abscess developed about 1-1/2 weeks ago went to urgent care did not need to be drained at that time was placed on clindamycin which she has been taking.  Over the course of the week he noticed the area began to swell and become puffy he took insulin needle and I&D to himself and got blood and pus out.  He noticed that the area decreased in swelling and it has improved however he got nervous that he may have introduced infection/was concerned about gangrene based off of pictures he saw online and wanted to come in for evaluation of the wounds.  Denies fevers nausea vomiting discharge from the wound pain or any other symptoms at this time

## 2022-11-20 NOTE — ED ADULT TRIAGE NOTE - CHIEF COMPLAINT QUOTE
Pt presents to ED, states "I have an abscess to my right buttock that was drained and I have been taking an antibiotic for 1.5wk. But now I am scared because it still doesn't look good. I'm scared I can get gangrene." Denies fevers/ chills, pain to abscess. Currently taking clindamycin.

## 2022-11-20 NOTE — ED PROVIDER NOTE - NSFOLLOWUPINSTRUCTIONS_ED_ALL_ED_FT
1. return for worsening symptoms or anything concerning to you  2. take all home meds as prescribed  3. follow up with your pmd call to make an appointment  4. take bactrim as directed  5. return to the ED in 24-48 hours for a wound check  6. see your pmd in 2 days for wound check    Cellulitis    WHAT YOU NEED TO KNOW:    Cellulitis is a skin infection caused by bacteria. Cellulitis may go away on its own or you may need treatment. Your healthcare provider may draw a [place] around the outside edges of your cellulitis. If your cellulitis spreads, your healthcare provider will see it outside of the [place]. Cellulitis          DISCHARGE INSTRUCTIONS:    Call 911 if:     You have sudden trouble breathing or chest pain.        Return to the emergency department if:     Your wound gets larger and more painful.       You feel a crackling under your skin when you touch it.      You have purple dots or bumps on your skin, or you see bleeding under your skin.      You have new swelling and pain in your legs.      The red, warm, swollen area gets larger.      You see red streaks coming from the infected area.    Contact your healthcare provider if:     You have a fever.      Your fever or pain does not go away or gets worse.      The area does not get smaller after 2 days of antibiotics.      Your skin is flaking or peeling off.      You have questions or concerns about your condition or care.    Medicines:     Antibiotics help treat the bacterial infection.       NSAIDs, such as ibuprofen, help decrease swelling, pain, and fever. NSAIDs can cause stomach bleeding or kidney problems in certain people. If you take blood thinner medicine, always ask if NSAIDs are safe for you. Always read the medicine label and follow directions. Do not give these medicines to children under 6 months of age without direction from your child's healthcare provider.      Acetaminophen decreases pain and fever. It is available without a doctor's order. Ask how much to take and how often to take it. Follow directions. Read the labels of all other medicines you are using to see if they also contain acetaminophen, or ask your doctor or pharmacist. Acetaminophen can cause liver damage if not taken correctly. Do not use more than 4 grams (4,000 milligrams) total of acetaminophen in one day.       Take your medicine as directed. Contact your healthcare provider if you think your medicine is not helping or if you have side effects. Tell him or her if you are allergic to any medicine. Keep a list of the medicines, vitamins, and herbs you take. Include the amounts, and when and why you take them. Bring the list or the pill bottles to follow-up visits. Carry your medicine list with you in case of an emergency.    Self-care:     Elevate the area above the level of your heart as often as you can. This will help decrease swelling and pain. Prop the area on pillows or blankets to keep it elevated comfortably.       Clean the area daily until the wound scabs over. Gently wash the area with soap and water. Pat dry. Use dressings as directed.       Place cool or warm, wet cloths on the area as directed. Use clean cloths and clean water. Leave it on the area until the cloth is room temperature. Pat the area dry with a clean, dry cloth. The cloths may help decrease pain.     Prevent cellulitis:     Do not scratch bug bites or areas of injury. You increase your risk for cellulitis by scratching these areas.       Do not share personal items, such as towels, clothing, and razors.       Clean exercise equipment with germ-killing  before and after you use it.      Wash your hands often. Use soap and water. Wash your hands after you use the bathroom, change a child's diapers, or sneeze. Wash your hands before you prepare or eat food. Use lotion to prevent dry, cracked skin. Handwashing           Wear pressure stockings as directed. You may be told to wear the stockings if you have peripheral edema. The stockings improve blood flow and decrease swelling.      Treat athlete’s foot. This can help prevent the spread of a bacterial skin infection.    Follow up with your healthcare provider within 3 days, or as directed: Your healthcare provider will check if your cellulitis is getting better. You may need different medicine. Write down your questions so you remember to ask them during your visits.

## 2022-11-20 NOTE — ED PROVIDER NOTE - CLINICAL SUMMARY MEDICAL DECISION MAKING FREE TEXT BOX
47-year-old male history of hypertension and prediabetes presents the emergency department for wound check.  Exam with left gluteus radha with scabbing about jani sized and small opening about dime sized with surrounding erythema/induration. no fluctuance discharge crepitus no tenderness. Bedside ultrasound performed.  There is no signs of abscess no signs of air in the deep tissue.  Minimal cobblestoning consistent with surrounding cellulitis. Wound appears to be healing however with induration and mild surrounding erythema appearance of mild persistent cellulitis.  No signs of abscess that needs to be drained.  No signs of necrotizing fasciitis or gangrene.  Will start new antibiotic as his last dose of clindamycin is today.  Will  patient to not I&D his own wounds discussed at length with patient and family the need to observe the wound closely to ensure that it is healing and does not develop into a more severe or serious infection discussed the fact that right now it does not appear to have any signs of abscess gangrene or necrotizing fasciitis however if he develops fevers pains discharge spreading redness or anything that is concerning to him he needs to return to the emergency department immediately for reevaluation of the wounds.  Patient is in agreement and understanding all questions answered.  Patient to follow-up with his PMD in 1 to 2 days will advised to return to the ED for wound check in 24 to 48 hours for wound check

## 2022-11-20 NOTE — ED PROVIDER NOTE - PHYSICAL EXAMINATION
Constitutional: NAD AAOx3  Eyes: PERRLA EOMI  Head: Normocephalic atraumatic  Mouth: MMM  Cardiac: regular rate (92 in room)  Resp: normal respiratory rate  Neuro: moving all extremities  Skin: left gluteus radha with scabbing about ajni sized and small opening about dime sized with surrounding erythema/induration. no fluctuance discharge crepitus no tenderness.

## 2023-01-17 ENCOUNTER — INPATIENT (INPATIENT)
Facility: HOSPITAL | Age: 48
LOS: 1 days | Discharge: HOME | DRG: 872 | End: 2023-01-19
Attending: INTERNAL MEDICINE | Admitting: INTERNAL MEDICINE
Payer: COMMERCIAL

## 2023-01-17 VITALS — HEIGHT: 67 IN | WEIGHT: 210.1 LBS

## 2023-01-17 DIAGNOSIS — R00.0 TACHYCARDIA, UNSPECIFIED: ICD-10-CM

## 2023-01-17 LAB
ALBUMIN SERPL ELPH-MCNC: 3.5 G/DL — SIGNIFICANT CHANGE UP (ref 3.3–5)
ALP SERPL-CCNC: 98 U/L — SIGNIFICANT CHANGE UP (ref 40–120)
ALT FLD-CCNC: 126 U/L — HIGH (ref 12–78)
ANION GAP SERPL CALC-SCNC: 8 MMOL/L — SIGNIFICANT CHANGE UP (ref 5–17)
ANION GAP SERPL CALC-SCNC: 9 MMOL/L — SIGNIFICANT CHANGE UP (ref 5–17)
APPEARANCE UR: CLEAR — SIGNIFICANT CHANGE UP
AST SERPL-CCNC: 69 U/L — HIGH (ref 15–37)
BASOPHILS # BLD AUTO: 0 K/UL — SIGNIFICANT CHANGE UP (ref 0–0.2)
BASOPHILS NFR BLD AUTO: 0 % — SIGNIFICANT CHANGE UP (ref 0–2)
BILIRUB SERPL-MCNC: 0.4 MG/DL — SIGNIFICANT CHANGE UP (ref 0.2–1.2)
BILIRUB UR-MCNC: NEGATIVE — SIGNIFICANT CHANGE UP
BUN SERPL-MCNC: 12 MG/DL — SIGNIFICANT CHANGE UP (ref 7–23)
BUN SERPL-MCNC: 16 MG/DL — SIGNIFICANT CHANGE UP (ref 7–23)
CALCIUM SERPL-MCNC: 8.2 MG/DL — LOW (ref 8.5–10.1)
CALCIUM SERPL-MCNC: 9.1 MG/DL — SIGNIFICANT CHANGE UP (ref 8.5–10.1)
CHLORIDE SERPL-SCNC: 103 MMOL/L — SIGNIFICANT CHANGE UP (ref 96–108)
CHLORIDE SERPL-SCNC: 109 MMOL/L — HIGH (ref 96–108)
CO2 SERPL-SCNC: 23 MMOL/L — SIGNIFICANT CHANGE UP (ref 22–31)
CO2 SERPL-SCNC: 25 MMOL/L — SIGNIFICANT CHANGE UP (ref 22–31)
COLOR SPEC: YELLOW — SIGNIFICANT CHANGE UP
CREAT SERPL-MCNC: 1.05 MG/DL — SIGNIFICANT CHANGE UP (ref 0.5–1.3)
CREAT SERPL-MCNC: 1.28 MG/DL — SIGNIFICANT CHANGE UP (ref 0.5–1.3)
D DIMER BLD IA.RAPID-MCNC: 734 NG/ML DDU — HIGH
DIFF PNL FLD: NEGATIVE — SIGNIFICANT CHANGE UP
EGFR: 69 ML/MIN/1.73M2 — SIGNIFICANT CHANGE UP
EGFR: 88 ML/MIN/1.73M2 — SIGNIFICANT CHANGE UP
EOSINOPHIL # BLD AUTO: 0 K/UL — SIGNIFICANT CHANGE UP (ref 0–0.5)
EOSINOPHIL NFR BLD AUTO: 0 % — SIGNIFICANT CHANGE UP (ref 0–6)
GLUCOSE SERPL-MCNC: 191 MG/DL — HIGH (ref 70–99)
GLUCOSE SERPL-MCNC: 304 MG/DL — HIGH (ref 70–99)
GLUCOSE UR QL: 1000 MG/DL
HCT VFR BLD CALC: 43.1 % — SIGNIFICANT CHANGE UP (ref 39–50)
HGB BLD-MCNC: 15 G/DL — SIGNIFICANT CHANGE UP (ref 13–17)
KETONES UR-MCNC: ABNORMAL
LACTATE SERPL-SCNC: 3 MMOL/L — HIGH (ref 0.7–2)
LEUKOCYTE ESTERASE UR-ACNC: NEGATIVE — SIGNIFICANT CHANGE UP
LIDOCAIN IGE QN: 83 U/L — SIGNIFICANT CHANGE UP (ref 73–393)
LYMPHOCYTES # BLD AUTO: 0.32 K/UL — LOW (ref 1–3.3)
LYMPHOCYTES # BLD AUTO: 2 % — LOW (ref 13–44)
MAGNESIUM SERPL-MCNC: 1.5 MG/DL — LOW (ref 1.6–2.6)
MCHC RBC-ENTMCNC: 32.6 PG — SIGNIFICANT CHANGE UP (ref 27–34)
MCHC RBC-ENTMCNC: 34.8 GM/DL — SIGNIFICANT CHANGE UP (ref 32–36)
MCV RBC AUTO: 93.7 FL — SIGNIFICANT CHANGE UP (ref 80–100)
MONOCYTES # BLD AUTO: 0.32 K/UL — SIGNIFICANT CHANGE UP (ref 0–0.9)
MONOCYTES NFR BLD AUTO: 2 % — SIGNIFICANT CHANGE UP (ref 2–14)
NEUTROPHILS # BLD AUTO: 15.24 K/UL — HIGH (ref 1.8–7.4)
NEUTROPHILS NFR BLD AUTO: 92 % — HIGH (ref 43–77)
NITRITE UR-MCNC: NEGATIVE — SIGNIFICANT CHANGE UP
NRBC # BLD: SIGNIFICANT CHANGE UP /100 WBCS (ref 0–0)
NT-PROBNP SERPL-SCNC: 49 PG/ML — SIGNIFICANT CHANGE UP (ref 0–125)
PH UR: 7 — SIGNIFICANT CHANGE UP (ref 5–8)
PLATELET # BLD AUTO: 197 K/UL — SIGNIFICANT CHANGE UP (ref 150–400)
POTASSIUM SERPL-MCNC: 3.3 MMOL/L — LOW (ref 3.5–5.3)
POTASSIUM SERPL-MCNC: 3.4 MMOL/L — LOW (ref 3.5–5.3)
POTASSIUM SERPL-SCNC: 3.3 MMOL/L — LOW (ref 3.5–5.3)
POTASSIUM SERPL-SCNC: 3.4 MMOL/L — LOW (ref 3.5–5.3)
PROT SERPL-MCNC: 7.4 GM/DL — SIGNIFICANT CHANGE UP (ref 6–8.3)
PROT UR-MCNC: 30 MG/DL
RAPID RVP RESULT: SIGNIFICANT CHANGE UP
RBC # BLD: 4.6 M/UL — SIGNIFICANT CHANGE UP (ref 4.2–5.8)
RBC # FLD: 12.6 % — SIGNIFICANT CHANGE UP (ref 10.3–14.5)
SARS-COV-2 RNA SPEC QL NAA+PROBE: SIGNIFICANT CHANGE UP
SODIUM SERPL-SCNC: 136 MMOL/L — SIGNIFICANT CHANGE UP (ref 135–145)
SODIUM SERPL-SCNC: 141 MMOL/L — SIGNIFICANT CHANGE UP (ref 135–145)
SP GR SPEC: 1 — LOW (ref 1.01–1.02)
TROPONIN I, HIGH SENSITIVITY RESULT: 5.16 NG/L — SIGNIFICANT CHANGE UP
TROPONIN I, HIGH SENSITIVITY RESULT: 6.65 NG/L — SIGNIFICANT CHANGE UP
TSH SERPL-MCNC: 1.03 UU/ML — SIGNIFICANT CHANGE UP (ref 0.34–4.82)
UROBILINOGEN FLD QL: NEGATIVE — SIGNIFICANT CHANGE UP
WBC # BLD: 16.21 K/UL — HIGH (ref 3.8–10.5)
WBC # FLD AUTO: 16.21 K/UL — HIGH (ref 3.8–10.5)

## 2023-01-17 PROCEDURE — 83605 ASSAY OF LACTIC ACID: CPT

## 2023-01-17 PROCEDURE — 36415 COLL VENOUS BLD VENIPUNCTURE: CPT

## 2023-01-17 PROCEDURE — 82962 GLUCOSE BLOOD TEST: CPT

## 2023-01-17 PROCEDURE — 83036 HEMOGLOBIN GLYCOSYLATED A1C: CPT

## 2023-01-17 PROCEDURE — 99285 EMERGENCY DEPT VISIT HI MDM: CPT

## 2023-01-17 PROCEDURE — 80061 LIPID PANEL: CPT

## 2023-01-17 PROCEDURE — 83735 ASSAY OF MAGNESIUM: CPT

## 2023-01-17 PROCEDURE — 74176 CT ABD & PELVIS W/O CONTRAST: CPT

## 2023-01-17 PROCEDURE — 85610 PROTHROMBIN TIME: CPT

## 2023-01-17 PROCEDURE — 85025 COMPLETE CBC W/AUTO DIFF WBC: CPT

## 2023-01-17 PROCEDURE — 80053 COMPREHEN METABOLIC PANEL: CPT

## 2023-01-17 PROCEDURE — 71275 CT ANGIOGRAPHY CHEST: CPT | Mod: 26,MA

## 2023-01-17 PROCEDURE — 93010 ELECTROCARDIOGRAM REPORT: CPT

## 2023-01-17 PROCEDURE — 71046 X-RAY EXAM CHEST 2 VIEWS: CPT | Mod: 26

## 2023-01-17 PROCEDURE — 93005 ELECTROCARDIOGRAM TRACING: CPT

## 2023-01-17 PROCEDURE — 85730 THROMBOPLASTIN TIME PARTIAL: CPT

## 2023-01-17 RX ORDER — MAGNESIUM SULFATE 500 MG/ML
2 VIAL (ML) INJECTION ONCE
Refills: 0 | Status: COMPLETED | OUTPATIENT
Start: 2023-01-17 | End: 2023-01-17

## 2023-01-17 RX ORDER — SODIUM CHLORIDE 9 MG/ML
1850 INJECTION INTRAMUSCULAR; INTRAVENOUS; SUBCUTANEOUS ONCE
Refills: 0 | Status: COMPLETED | OUTPATIENT
Start: 2023-01-17 | End: 2023-01-17

## 2023-01-17 RX ORDER — PIPERACILLIN AND TAZOBACTAM 4; .5 G/20ML; G/20ML
3.38 INJECTION, POWDER, LYOPHILIZED, FOR SOLUTION INTRAVENOUS ONCE
Refills: 0 | Status: COMPLETED | OUTPATIENT
Start: 2023-01-17 | End: 2023-01-17

## 2023-01-17 RX ORDER — SODIUM CHLORIDE 9 MG/ML
1000 INJECTION INTRAMUSCULAR; INTRAVENOUS; SUBCUTANEOUS ONCE
Refills: 0 | Status: COMPLETED | OUTPATIENT
Start: 2023-01-17 | End: 2023-01-17

## 2023-01-17 RX ADMIN — Medication 100 GRAM(S): at 19:13

## 2023-01-17 RX ADMIN — PIPERACILLIN AND TAZOBACTAM 200 GRAM(S): 4; .5 INJECTION, POWDER, LYOPHILIZED, FOR SOLUTION INTRAVENOUS at 23:56

## 2023-01-17 RX ADMIN — SODIUM CHLORIDE 925 MILLILITER(S): 9 INJECTION INTRAMUSCULAR; INTRAVENOUS; SUBCUTANEOUS at 18:24

## 2023-01-17 RX ADMIN — SODIUM CHLORIDE 2000 MILLILITER(S): 9 INJECTION INTRAMUSCULAR; INTRAVENOUS; SUBCUTANEOUS at 17:50

## 2023-01-17 NOTE — ED STATDOCS - ATTENDING CONTRIBUTION TO CARE
I, Angela Montoya MD, personally saw the patient with ACP.  I have personally performed a face to face diagnostic evaluation on this patient.  I have reviewed the ACP note and agree with the history, exam, and plan of care, except as noted.  I personally saw the patient and performed a substantive portion of the visit including all aspects of the medical decision making.

## 2023-01-17 NOTE — ED ADULT NURSE NOTE - PRIMARY CARE PROVIDER
[place] AMBULATORY ENCOUNTER  HEMATOLOGY/ONCOLOGY CONSULT NOTE      REASON FOR CONSULTATION:  I am seeing Lyubov Page at the request of Raymond Don DO for my opinion regarding evaluation and management of elevated hematocrit.    SOURCE OF INFORMATION:  Patient and [facility] electronic medical record    CHIEF COMPLAINT:  Consultation      HISTORY OF PRESENT ILLNESS:  Lyubov Page is a 63 year old female seen today for elevated hematocrit. She has a long-standing history of elevated hemoglobin and hematocrit. She had been worked up by me for this in May 2013. At that time, she was noted to have a normal erythropoeitin level as well as negative PUNEET 2 mutation. Subsequently, her hemoglobin did decline but over the last year or so, it has been a rising again.  She does complain of being sweaty but does not report any drenching night sweats. She also denies any fevers, shaking chills or weight loss. She does not report any burning in her palms or soles.  She does not report any unusual headaches. She seldom gets a headache. Her vision has remained unchanged. She does have a prior history of smoking. Now, she smokes a cigarette here and there and not on a regular basis.  She does not report any shortness of breath at rest or with walking. She avoids climbing stairs. She has not had any wheezing.  She feels that her sinuses are runny and she can get a post nasal drip resulting in cough.    Please see the Review of Systems.  Positives are highlighted.     MEDICATIONS AND ALLERGIES:    Current Outpatient Prescriptions   Medication Sig Dispense Refill   • dulaglutide (TRULICITY) 1.5 MG/0.5ML pen-injector Inject 1.5 mg into the skin once a week. 2 mL 11   • blood glucose (FREESTYLE LITE) test strip Test blood sugar 2 times daily as directed. Diagnosis: E11.9. Meter: Freestyle lite 100 each 11   • LANCETS ULTRA FINE Misc Test freestyle lite BID  Dx:E11.9 100 each 11   • lisinopril (ZESTRIL) 20 MG tablet Take 1  tablet by mouth daily. 30 tablet 3   • DISPENSE Per Dr. Don patient to use [facility]. Dx- OA/RA 1 each 0   • Glucose Blood (MARLEN CONTOUR NEXT TEST) strip TEST TWICE A  each 12   • DISPENSE LANCETS FOR CONTOUR NEXT MONITOR.  TEST BID. 100 each 12     No current facility-administered medications for this visit.      ALLERGIES:   Allergen Reactions   • Aspirin HIVES   • Ciprofloxin Hcl [Ciprofloxacin Hcl]      Chest pain and pain down arms     • Xeljanz [Tofacitinib Citrate] VOMITING   • Levaquin        PROBLEM LIST:  Patient Active Problem List   Diagnosis   • Glucose intolerance (impaired glucose tolerance)   • SABINE (obstructive sleep apnea)   • Rheumatoid arthritis   • Encounter for long-term (current) use of other medications   • Hypertension   • Depression with anxiety   • Colon polyps   • Long-term use of immunosuppressant medication   • Diabetes (CMS/HCC)   • Primary osteoarthritis of both knees   • Fibroid   • Post-menopausal bleeding   • Urine, incontinence, stress female   • RA (rheumatoid arthritis) (CMS/HCC)        HISTORIES:  Past Medical History:   Diagnosis Date   • Colon polyps    • COPD (chronic obstructive pulmonary disease) (CMS/HCC)    • Depression with anxiety    • Diabetes (CMS/HCC)    • Fibroid    • Fibromyalgia    • Hyperlipidemia    • Hypertension    • Long-term use of immunosuppressant medication 8/5/2013   • SABINE (obstructive sleep apnea)     don't wear machine   • Other nonspecific findings on examination of blood    • Post-menopausal bleeding    • Primary osteoarthritis of both knees 11/17/2015   • Rheumatoid arthritis (CMS/HCC)    • Urine, incontinence, stress female      Past Surgical History:   Procedure Laterality Date   • BIOPSY OF BREAST, INCISIONAL  1989    Breast Biopsy benign   • CHOLECYSTECTOMY  age 26     Cholecystectomy (gallbladder)   • COLONOSCOPY DIAGNOSTIC  09/07/2011    Colonoscopy, Dx   • ESOPHAGOGASTRODUODENOSCOPY TRANSORAL FLEX W/BX SINGLE OR MULT   12/12/2011    EGD with Bx   • GANGLION CYST EXCISION  1988    left wrist    • HARDWARE REMOVAL  09/21/2011    tibia/fibula   • INCONTINENCE SURGERY  05/18/2016    Midurethral sling with TVT   • LEFT HEART CATH,PERCUTANEOUS  02/13/2012    Cardiac Cath   • ORIF TIBIA & FIBULA FRACTURES Right 02/15/2006   • SALPINGOOPHORECTOMY Bilateral 05/18/2016   • TOTAL LAPAROSCOPIC HYSTERECT  05/18/2016   • TUBAL LIGATION  1975    Tubal Ligation     Family History   Problem Relation Age of Onset   • Cancer Mother      stomach   • Heart disease Father    • Heart attack Father    • Diabetes Brother    • Heart disease Brother    • Heart disease Brother      Social History   Substance Use Topics   • Smoking status: Former Smoker     Packs/day: 2.00     Years: 30.00     Types: Cigarettes     Quit date: 6/2/2014   • Smokeless tobacco: Never Used   • Alcohol use No       REVIEW OF SYSTEMS:    Misa Kelsey MA  5/18/2017  1:10 PM  Sign at close encounter  REVIEW OF SYSTEMS:  GENERAL:  Patient denies fevers, chills, excessive fatigue, anorexia, weight loss, weakness, hot flashes, but complains of:  Night/day sweats  EYES:  Patient denies blurred vision, double vision, pain, burning & itching.   NEUROLOGIC:  Patient denies headache, dizziness, numbness, tingling, loss of balance, insomnia.  GASTROINTESTINAL:  Patient denies nausea, vomiting, abdominal pain, constipation, blood in stool, indigestion/heartburn, but complains of:  Diarrhea , abdominal cramping.  CARDIOVASCULAR:  Patient denies chest pain, palpitations.  SKIN:  Patient denies skin rashes, bruising, persistent itching.  MUSCULOSKELETAL:  Patient complains of:  joint pain, bone pain (RA) and leg and ankle swelling.  ENT/MOUTH:  Patient denies dysphagia, dry mouth, sore throat, sores on tongue, mouth sores,  hearing loss Complains of:  Sinus drainage  :  Patient denies painful urination, urinary frequency, blood in urine, nocturia, but complains of urgency  RESPIRATORY:   Patient denies wheezing, frequent cough, shortness of breath.  PSYCH: Patient denies anxiety, depression    Concerns:  Medications reviewed and updated.  Denies known Latex allergy or symptoms of Latex sensitivity.        PHYSICAL EXAM:  Vital Signs:   Oncology Encounter Vitals   ONC OP Encounter Vitals Group      BP 05/18/17 1310 158/88      Pulse 05/18/17 1310 74      Resp 05/18/17 1310 16      Temp 05/18/17 1310 98.6 °F (37 °C)      Temp src 05/18/17 1310 Oral      SpO2 05/18/17 1310 96 %      Weight 05/18/17 1310 278 lb 1.6 oz (126.1 kg)      Height --       Pain Score 05/18/17 1310 0      Pain Location --       Pain Education? --       BSA (Calculated - m2) - [employer] --       BMI (Calculated) --      ECOG Performance Status: 1 - No physically strenuous activity, but ambulatory and able to carry out light or sedentary work.  General: The patient is alert, well-developed, well-nourished, no distress.  Skin: Warm, normal color, normal texture, normal turgor and without rash.  Head: Normocephalic, atraumatic.  Neck: Supple with no significant adenopathy.  Eyes: Normal conjunctivae and sclerae. Pupils equal, round, reactive to light and accommodation. Extraocular movements intact.  ENT: Mucous membranes are moist. Normal nose,mouth and throat.  Cardiovascular: Regular rate and rhythm, no murmurs, gallops or rubs.  Respiratory: Normal respiratory effort. Clear to auscultation. No wheezes, rales, or rhonchi.  Abdomen: Abdomen is soft and non-tender with active bowel sounds. There is no detected organomegaly, masses, or ascites.  Extremities: No clubbing, no cyanosis, no edema and normal muscle tone and development bilaterally.  Lymph: No cervical, supraclavicular, axillary, inguinal lymphadenopathy.  Neurologic: Motor strength normal, coordination normal, no tremor noted.  Psychiatric: Cooperative. Appropriate mood and affect. Normal judgment.    LABORATORY DATA:    Recent Labs  Lab 05/18/17  1354   WBC 8.7  see MD note   RBC 6.04*   HGB 18.6*   HCT 55.1*   MCV 91.2      ANEUT 5.7   ALYMS 2.0   LUIS 0.9   AEOS 0.2   ABASO 0.0       Recent Labs  Lab 05/18/17  1354   GLUCOSE 163*   SODIUM 141   POTASSIUM 3.9   CHLORIDE 104   CO2 31   BUN 8   CREATININE 0.73   CALCIUM 8.8   TOTPROTEIN 6.8   ALBUMIN 3.5*   AST 23   ALKPT 112   GPT 35       Recent Labs  Lab 05/18/17  1354   ANIONGAP 10   GLOB 3.3      BILIRUBIN 0.8       IMAGING STUDIES:none    ASSESSMENT/PLAN:  ASSESSMENT: (R71.8) Elevated hematocrit  (primary encounter diagnosis)  Comment: Therefore, she again has recurrent elevation in hematocrit. Prior evaluation did not reveal any evidence of polycythemia vera. However, with this new elevation, repeat evaluation is warranted. I will obtain PUNEET 2 mutation, erythropoietin and LDH levels. If these are normal, she will need to undergo pulmonary function testing to determine if lung disease could be responsible for this. If that is unremarkable, then bubble echocardiogram will be recommended.  Plan: CBC & AUTO DIFFERENTIAL, COMPREHENSIVE         METABOLIC PANEL, LACTATE DEHYDROGENASE TOTAL,         JAK2 TYROSINE QUALITATIVE, ERYTHROPOIETIN        Today.  I will call her with the results.       The patient indicated understanding of the diagnosis and agreed with the plan of care.        A copy of this note was sent to the referring provider.

## 2023-01-17 NOTE — ED STATDOCS - PHYSICAL EXAMINATION
Constitutional: NAD AAOx3  Eyes: PERRL, EOMI  Head: Normocephalic atraumatic  Mouth: MMM  Cardiac: +tachycardic  Resp: Lungs CTAB  GI: Abd s/nt/nd  Neuro: CN2-12 intact  Extremities: Intact distal pulses b/l, no calf tenderness, normal ROM b/l UE and LE   Skin: No rashes Constitutional: NAD AAOx3, obese  Eyes: PERRL, EOMI  Head: Normocephalic atraumatic  Mouth: MMM  Cardiac: +tachycardic  Resp: Lungs CTAB  GI: Abd s/nt/nd  Neuro: CN2-12 intact  Extremities: Intact distal pulses b/l, normal ROM b/l UE and LE   Skin: No rashes

## 2023-01-17 NOTE — PHARMACOTHERAPY INTERVENTION NOTE - COMMENTS
Medication reconciliation completed.  Reviewed Medication list and confirmed med allergies with patient; confirmed with Dr. First Medsophia.

## 2023-01-17 NOTE — ED ADULT NURSE NOTE - IN THE PAST 12 MONTHS HAVE YOU USED DRUGS OTHER THAN THOSE REQUIRED FOR MEDICAL REASON?
Chart reviewed.   Immunizations: updated  Orders placed: n/a  Upcoming appts to satisfy NELIA topics: Optometry 3/04, Gastro 3/18    
No

## 2023-01-17 NOTE — ED STATDOCS - PROGRESS NOTE DETAILS
Pt. is a 47 year old male Hx HTN, presents with tracycardia.  Pt. felt chills, nausea, weakness today.  Pt. felt mild SOB.  Pt. was sent from  for evaluation.  Pt. has had flu contact at his job.  NOrmal EKG, Stress, Echo 2 years ago from Dr. Nice.  PT. curry resting .  PT. reports anxiety.  Pt. stating he had an abscess late last year.  WHen he was evaluated in ED it had already ruptured.  Pt. was on ABX prior to it rupturing from  and was on ABX after being in ED.  PT. had diarrhea for a number of weeks.  Pt. further stating that he is self injecting himself with insulin that he buys on the internet.  He further states that he sometimes does not alcohol prep the skin.  Libertad Crane PA-C Pt. receive 3L IVF and repeat lactate 2.5.  PT. still tachycardic 126.  Magnesium replenished.  Mild hypokalmeia.  D Dimer + with negative PE on CTA.  Leukocytosis 16 with shift.  Pt. to be admitted for tachycardia and lactic acidosis. PMD out of town.  Repeat BNP ordered.  Libertad Crane PA-C Pt. is a 47 year old male Hx HTN, presents with tachycardia.  Pt. felt chills, nausea, weakness today.  Pt. felt mild SOB.  Pt. was sent from  for evaluation.  Pt. has had flu contact at his job.  NOrmal EKG, Stress, Echo 2 years ago from Dr. Nice.  PT. curry resting .  PT. reports anxiety.  Pt. stating he had an abscess late last year.  WHen he was evaluated in ED it had already ruptured.  Pt. was on ABX prior to it rupturing from  and was on ABX after being in ED.  PT. had diarrhea for a number of weeks.  Pt. further stating that he is self injecting himself with insulin that he buys on the internet.  He further states that he sometimes does not alcohol prep the skin.  Libertad Crane PA-C Pt. receive 3L IVF and repeat lactate 2.5.  PT. still tachycardic 126.  Magnesium replenished.  Mild hypokalemia.  D Dimer + with negative PE on CTA.  Leukocytosis 16 with shift.  Pt. to be admitted for tachycardia and lactic acidosis. PMD out of town.  Repeat BNP ordered.  Libertad Crane PA-C

## 2023-01-17 NOTE — ED STATDOCS - CLINICAL SUMMARY MEDICAL DECISION MAKING FREE TEXT BOX
EKG is sinus tachycardic at a rate of 149 with no acute st segment changes, non specific t wave abnormalities, labs and imaging obtained, EKG is sinus tachycardic at a rate of 149 with no acute st segment changes, non specific t wave abnormalities, labs and imaging obtained, pt's lactate was 3, wbc 16, glucose is elevated, normal AG, blood cx's sent, tachycardia improved but remains in the 120's, d/w medicine and will admit for further evaluation and care.

## 2023-01-17 NOTE — ED ADULT NURSE NOTE - OBJECTIVE STATEMENT
Pt presented to the ER with c/o tachycardia. Pt stated that he did not feel well for approx 2 hours. Pt went to urgent care and was told his HR was around 150. Pt stated "I usually have a high heart rate it is usually around 115, I tend to get all worked up over things." Pt denies any CP, dizziness, or SOB at this time.

## 2023-01-17 NOTE — ED STATDOCS - NS ED ATTENDING STATEMENT MOD
I have seen and examined this patient and fully participated in the care of this patient as the teaching attending.  The service was shared with the COURTNEY.  I reviewed and verified the documentation and independently performed the documented:

## 2023-01-17 NOTE — ED ADULT TRIAGE NOTE - CHIEF COMPLAINT QUOTE
patient presents from urgent care c/o tachycardia.  patient sent to ED for HR in 140s.  denies palpitations, lightheadedness, chest pain.  notes feeling anxious during EKG.  patient was seen at urgent care for fatigue, chills, nausea starting today.  notes + flu contact at work.

## 2023-01-17 NOTE — ED STATDOCS - OBJECTIVE STATEMENT
47 year old male w/ PMHx of HTN presents to the ED sent from  due to tachycardia. Pt went to  today for chills, nausea, weakness that began today at 1pm and was found to be tachycardic, HR in the 140s. + flu contact at work. Pt has had sonogram, EKG, and stress test 2 years ago with normal results. Pt states his HR is usually at around 110. Notes feeling anxious at this time. Denies anticoagulation use.   Cardio : Dr. Nice

## 2023-01-18 LAB
ALBUMIN SERPL ELPH-MCNC: 3.3 G/DL — SIGNIFICANT CHANGE UP (ref 3.3–5)
ALP SERPL-CCNC: 74 U/L — SIGNIFICANT CHANGE UP (ref 40–120)
ALT FLD-CCNC: 104 U/L — HIGH (ref 12–78)
ANION GAP SERPL CALC-SCNC: 5 MMOL/L — SIGNIFICANT CHANGE UP (ref 5–17)
APTT BLD: 30.8 SEC — SIGNIFICANT CHANGE UP (ref 27.5–35.5)
AST SERPL-CCNC: 46 U/L — HIGH (ref 15–37)
BASOPHILS # BLD AUTO: 0.07 K/UL — SIGNIFICANT CHANGE UP (ref 0–0.2)
BASOPHILS NFR BLD AUTO: 0.6 % — SIGNIFICANT CHANGE UP (ref 0–2)
BILIRUB SERPL-MCNC: 0.9 MG/DL — SIGNIFICANT CHANGE UP (ref 0.2–1.2)
BUN SERPL-MCNC: 9 MG/DL — SIGNIFICANT CHANGE UP (ref 7–23)
CALCIUM SERPL-MCNC: 8.7 MG/DL — SIGNIFICANT CHANGE UP (ref 8.5–10.1)
CHLORIDE SERPL-SCNC: 106 MMOL/L — SIGNIFICANT CHANGE UP (ref 96–108)
CHOLEST SERPL-MCNC: 136 MG/DL — SIGNIFICANT CHANGE UP
CO2 SERPL-SCNC: 26 MMOL/L — SIGNIFICANT CHANGE UP (ref 22–31)
CREAT SERPL-MCNC: 0.9 MG/DL — SIGNIFICANT CHANGE UP (ref 0.5–1.3)
EGFR: 106 ML/MIN/1.73M2 — SIGNIFICANT CHANGE UP
EOSINOPHIL # BLD AUTO: 0.05 K/UL — SIGNIFICANT CHANGE UP (ref 0–0.5)
EOSINOPHIL NFR BLD AUTO: 0.4 % — SIGNIFICANT CHANGE UP (ref 0–6)
GLUCOSE BLDC GLUCOMTR-MCNC: 229 MG/DL — HIGH (ref 70–99)
GLUCOSE BLDC GLUCOMTR-MCNC: 249 MG/DL — HIGH (ref 70–99)
GLUCOSE BLDC GLUCOMTR-MCNC: 256 MG/DL — HIGH (ref 70–99)
GLUCOSE SERPL-MCNC: 269 MG/DL — HIGH (ref 70–99)
HCT VFR BLD CALC: 41.8 % — SIGNIFICANT CHANGE UP (ref 39–50)
HDLC SERPL-MCNC: 37 MG/DL — LOW
HGB BLD-MCNC: 14.7 G/DL — SIGNIFICANT CHANGE UP (ref 13–17)
IMM GRANULOCYTES NFR BLD AUTO: 0.5 % — SIGNIFICANT CHANGE UP (ref 0–0.9)
INR BLD: 1.38 RATIO — HIGH (ref 0.88–1.16)
LACTATE SERPL-SCNC: 1.7 MMOL/L — SIGNIFICANT CHANGE UP (ref 0.7–2)
LIPID PNL WITH DIRECT LDL SERPL: 84 MG/DL — SIGNIFICANT CHANGE UP
LYMPHOCYTES # BLD AUTO: 0.79 K/UL — LOW (ref 1–3.3)
LYMPHOCYTES # BLD AUTO: 7.1 % — LOW (ref 13–44)
MAGNESIUM SERPL-MCNC: 1.9 MG/DL — SIGNIFICANT CHANGE UP (ref 1.6–2.6)
MCHC RBC-ENTMCNC: 33.1 PG — SIGNIFICANT CHANGE UP (ref 27–34)
MCHC RBC-ENTMCNC: 35.2 GM/DL — SIGNIFICANT CHANGE UP (ref 32–36)
MCV RBC AUTO: 94.1 FL — SIGNIFICANT CHANGE UP (ref 80–100)
MONOCYTES # BLD AUTO: 1.04 K/UL — HIGH (ref 0–0.9)
MONOCYTES NFR BLD AUTO: 9.3 % — SIGNIFICANT CHANGE UP (ref 2–14)
NEUTROPHILS # BLD AUTO: 9.16 K/UL — HIGH (ref 1.8–7.4)
NEUTROPHILS NFR BLD AUTO: 82.1 % — HIGH (ref 43–77)
NON HDL CHOLESTEROL: 100 MG/DL — SIGNIFICANT CHANGE UP
PLATELET # BLD AUTO: 168 K/UL — SIGNIFICANT CHANGE UP (ref 150–400)
POTASSIUM SERPL-MCNC: 3.7 MMOL/L — SIGNIFICANT CHANGE UP (ref 3.5–5.3)
POTASSIUM SERPL-SCNC: 3.7 MMOL/L — SIGNIFICANT CHANGE UP (ref 3.5–5.3)
PROT SERPL-MCNC: 7.2 GM/DL — SIGNIFICANT CHANGE UP (ref 6–8.3)
PROTHROM AB SERPL-ACNC: 16.1 SEC — HIGH (ref 10.5–13.4)
RBC # BLD: 4.44 M/UL — SIGNIFICANT CHANGE UP (ref 4.2–5.8)
RBC # FLD: 13 % — SIGNIFICANT CHANGE UP (ref 10.3–14.5)
SODIUM SERPL-SCNC: 137 MMOL/L — SIGNIFICANT CHANGE UP (ref 135–145)
TRIGL SERPL-MCNC: 81 MG/DL — SIGNIFICANT CHANGE UP
WBC # BLD: 11.17 K/UL — HIGH (ref 3.8–10.5)
WBC # FLD AUTO: 11.17 K/UL — HIGH (ref 3.8–10.5)

## 2023-01-18 PROCEDURE — 93010 ELECTROCARDIOGRAM REPORT: CPT

## 2023-01-18 PROCEDURE — 99255 IP/OBS CONSLTJ NEW/EST HI 80: CPT

## 2023-01-18 PROCEDURE — 99223 1ST HOSP IP/OBS HIGH 75: CPT

## 2023-01-18 PROCEDURE — 74176 CT ABD & PELVIS W/O CONTRAST: CPT | Mod: 26

## 2023-01-18 RX ORDER — SODIUM CHLORIDE 9 MG/ML
1000 INJECTION, SOLUTION INTRAVENOUS
Refills: 0 | Status: DISCONTINUED | OUTPATIENT
Start: 2023-01-18 | End: 2023-01-19

## 2023-01-18 RX ORDER — PIPERACILLIN AND TAZOBACTAM 4; .5 G/20ML; G/20ML
3.38 INJECTION, POWDER, LYOPHILIZED, FOR SOLUTION INTRAVENOUS EVERY 8 HOURS
Refills: 0 | Status: DISCONTINUED | OUTPATIENT
Start: 2023-01-18 | End: 2023-01-18

## 2023-01-18 RX ORDER — ACETAMINOPHEN 500 MG
650 TABLET ORAL EVERY 6 HOURS
Refills: 0 | Status: DISCONTINUED | OUTPATIENT
Start: 2023-01-18 | End: 2023-01-19

## 2023-01-18 RX ORDER — DEXTROSE 50 % IN WATER 50 %
12.5 SYRINGE (ML) INTRAVENOUS ONCE
Refills: 0 | Status: DISCONTINUED | OUTPATIENT
Start: 2023-01-18 | End: 2023-01-19

## 2023-01-18 RX ORDER — DEXTROSE 50 % IN WATER 50 %
25 SYRINGE (ML) INTRAVENOUS ONCE
Refills: 0 | Status: DISCONTINUED | OUTPATIENT
Start: 2023-01-18 | End: 2023-01-19

## 2023-01-18 RX ORDER — AMLODIPINE BESYLATE 2.5 MG/1
10 TABLET ORAL DAILY
Refills: 0 | Status: DISCONTINUED | OUTPATIENT
Start: 2023-01-18 | End: 2023-01-19

## 2023-01-18 RX ORDER — INSULIN LISPRO 100/ML
VIAL (ML) SUBCUTANEOUS AT BEDTIME
Refills: 0 | Status: DISCONTINUED | OUTPATIENT
Start: 2023-01-18 | End: 2023-01-19

## 2023-01-18 RX ORDER — POTASSIUM CHLORIDE 20 MEQ
20 PACKET (EA) ORAL ONCE
Refills: 0 | Status: COMPLETED | OUTPATIENT
Start: 2023-01-18 | End: 2023-01-18

## 2023-01-18 RX ORDER — DEXTROSE 50 % IN WATER 50 %
15 SYRINGE (ML) INTRAVENOUS ONCE
Refills: 0 | Status: DISCONTINUED | OUTPATIENT
Start: 2023-01-18 | End: 2023-01-19

## 2023-01-18 RX ORDER — VANCOMYCIN HCL 1 G
1000 VIAL (EA) INTRAVENOUS EVERY 12 HOURS
Refills: 0 | Status: DISCONTINUED | OUTPATIENT
Start: 2023-01-18 | End: 2023-01-18

## 2023-01-18 RX ORDER — AMLODIPINE BESYLATE AND OLMESARTRAN MEDOXOMIL 10; 40 MG/1; MG/1
1 TABLET, FILM COATED ORAL
Qty: 0 | Refills: 0 | DISCHARGE

## 2023-01-18 RX ORDER — GLUCAGON INJECTION, SOLUTION 0.5 MG/.1ML
1 INJECTION, SOLUTION SUBCUTANEOUS ONCE
Refills: 0 | Status: DISCONTINUED | OUTPATIENT
Start: 2023-01-18 | End: 2023-01-19

## 2023-01-18 RX ORDER — SODIUM CHLORIDE 9 MG/ML
1000 INJECTION INTRAMUSCULAR; INTRAVENOUS; SUBCUTANEOUS
Refills: 0 | Status: DISCONTINUED | OUTPATIENT
Start: 2023-01-18 | End: 2023-01-19

## 2023-01-18 RX ORDER — IBUPROFEN 200 MG
400 TABLET ORAL EVERY 6 HOURS
Refills: 0 | Status: DISCONTINUED | OUTPATIENT
Start: 2023-01-18 | End: 2023-01-19

## 2023-01-18 RX ORDER — INSULIN LISPRO 100/ML
VIAL (ML) SUBCUTANEOUS
Refills: 0 | Status: DISCONTINUED | OUTPATIENT
Start: 2023-01-18 | End: 2023-01-19

## 2023-01-18 RX ADMIN — Medication 400 MILLIGRAM(S): at 10:30

## 2023-01-18 RX ADMIN — Medication 2: at 07:57

## 2023-01-18 RX ADMIN — SODIUM CHLORIDE 100 MILLILITER(S): 9 INJECTION INTRAMUSCULAR; INTRAVENOUS; SUBCUTANEOUS at 04:45

## 2023-01-18 RX ADMIN — Medication 400 MILLIGRAM(S): at 18:31

## 2023-01-18 RX ADMIN — Medication 20 MILLIEQUIVALENT(S): at 05:57

## 2023-01-18 RX ADMIN — PIPERACILLIN AND TAZOBACTAM 25 GRAM(S): 4; .5 INJECTION, POWDER, LYOPHILIZED, FOR SOLUTION INTRAVENOUS at 05:57

## 2023-01-18 RX ADMIN — Medication 1: at 21:12

## 2023-01-18 RX ADMIN — Medication 2: at 12:24

## 2023-01-18 RX ADMIN — Medication 400 MILLIGRAM(S): at 17:47

## 2023-01-18 RX ADMIN — AMLODIPINE BESYLATE 10 MILLIGRAM(S): 2.5 TABLET ORAL at 09:35

## 2023-01-18 RX ADMIN — Medication 400 MILLIGRAM(S): at 09:34

## 2023-01-18 NOTE — H&P ADULT - HISTORY OF PRESENT ILLNESS
48 y/o M w/ PMH of HTN, DM2, p/w tachycardia. Patient had nausea / chills / weakness, so he went to urgent care with symptoms, and found to have tachycardia, so was referred to ED. States he has baseline tachycardia in low 100s. Patient also admits to having nausea, but denies vomiting. States he had diarrhea about 4-6 weeks ago after taking antibiotics, but he hasn't had any diarrhea recently. Patient denies cough/ runny nose / sore throat / abdominal pain      PSH: Denies     Social Hx: Denies tobacco / etoh / drugs     Family Hx: Father - heart disease

## 2023-01-18 NOTE — H&P ADULT - ASSESSMENT
46 y/o M w/ PMH of HTN, DM2, p/w tachycardia    *SIRS vs Severe sepsis 2/2 viral syndrome vs unknown etiology?  -Patient given Zosyn in ED, will give vanco / zosyn for now and consult ID for further recommendations since source is unclear   -F/u blood cx  -RVP / COVID negative  -UA negative  -CT chest: Negative for PNA   -CT abdomen for further evaluation   -Lactic acidosis improving, will recheck  -IVF   -Tachycardia has improved -> possible sign of infection? Will monitor for now, and if continues to have tachycardia after infection ruled out, will consult cardio for further evaluation. Also counselled on avoiding taking non-FDA approved medications / alternative medications, due to unknown side effects     *DM2  -Humalog ISS  -Diabetic diet  -HbA1c  -Patient states he was recommended to take metformin. However, he is non-adherent. He states he has been purchasing insulin himself and treating himself with insulin, so that he can eat more food    *Hypokalemia  -Replete and recheck     *HTN  -C/w home meds     *DVT ppx   -SCDs  46 y/o M w/ PMH of HTN, DM2, p/w tachycardia    *SIRS vs Severe sepsis 2/2 viral syndrome vs unknown etiology?  -Patient given Zosyn in ED, will give vanco / zosyn for now and consult ID for further recommendations since source is unclear   -F/u blood cx  -RVP / COVID negative  -UA negative  -CT chest: Negative for PNA   -CT abdomen for further evaluation   -Lactic acidosis improving, will recheck  -IVF   -Tachycardia has improved -> possible sign of infection? Will monitor for now, and if continues to have tachycardia after infection ruled out, will consult cardio for further evaluation. Also counselled on avoiding taking non-FDA approved medications / alternative medications, due to unknown side effects     *DM2  -Humalog ISS  -Diabetic diet  -HbA1c  -Patient states he was recommended to take metformin. However, he is non-adherent. He states he has been purchasing insulin himself and treating himself with insulin, so that he can eat more food    *Hypokalemia  -Replete and recheck     *HTN  -C/w home meds     *DVT ppx   -SCDs    46 y/o M w/ PMH of HTN, DM2, p/w tachycardia    *SIRS vs Severe sepsis 2/2 viral syndrome vs unknown etiology?  -Patient given Zosyn in ED, will give vanco / zosyn for now and consult ID for further recommendations since source is unclear   -F/u blood cx  -RVP / COVID negative  -UA negative  -CT chest: Negative for PNA   -CT abdomen for further evaluation   -Lactic acidosis improving, will recheck  -IVF     *Tachycardia  -Tachycardia has improved -> possible sign of infection?   -Patient states he has a baseline HR of low 100s, but on EKG HR is 149 w/ QTc prolongation of 544  -Will need to avoid QT prolonging meds  -Cardio consult   -Also counseled on avoiding taking non-FDA approved medications / alternative medications, due to unknown side effects     *DM2  -Humalog ISS  -Diabetic diet  -HbA1c  -Patient states he was recommended to take metformin. However, he is non-adherent. He states he has been purchasing insulin himself and treating himself with insulin, so that he can eat more food    *Hypokalemia  -Replete and recheck     *HTN  -C/w home meds     *DVT ppx   -SCDs

## 2023-01-18 NOTE — CONSULT NOTE ADULT - SUBJECTIVE AND OBJECTIVE BOX
SUBJECTIVE:    CHIEF COMPLAINT:  Patient is a 47y old  Male who presents with a chief complaint of nausea / chills / weakness (2023 07:27)      HPI:  46 y/o M w/ PMH of HTN, DM2, p/w tachycardia. Patient had nausea / chills / weakness, so he went to urgent care with symptoms, and found to have tachycardia, so was referred to ED. States he has baseline tachycardia in low 100s. Patient also admits to having nausea, but denies vomiting. States he had diarrhea about 4-6 weeks ago after taking antibiotics, but he hasn't had any diarrhea recently. Patient denies cough/ runny nose / sore throat / abdominal pain      The patient is well known to me. Poorly compliant with diet and medications Often takes off label medications and self medicates. Resistant to western medicine. Likes homeopathic meds and nutraceuticals. Was self medicating with insulin which he purchased from [employer] without Rx.    Active Problems  Acute gastroenteritis (558.9) (K52.9)  Diabetes mellitus (250.00) (E11.9)  History of sleep apnea (V13.89) (Z86.69)  HTN (hypertension) (401.9) (I10)  Obesity (BMI 30-39.9) (278.00) (E66.9)  Wrist sprain, right, initial encounter (842.00) (S63.501A)    Past Medical History  History of sleep apnea (V13.89) (Z86.69)    Surgical History  History of Knee Surgery Right  · -ACL Reconstruction  History of Operation On Pharynx For Obstructive Sleep Apnea And Snoring  · -ENT, Sleep Apnea Corrective surgery    Family History  Family history of breast cancer (V16.3) (Z80.3)  Family history of CVA (V17.1) (Z82.3)  Family history of hyperlipidemia (V18.19) (Z83.438)  FH: HTN (hypertension) (V17.49) (Z82.49)  FHx: heart disease (V17.49) (Z82.49) : Father    Social History  Never a smoker  No illicit drug use  Non-smoker (V49.89) (Z78.9)  Rarely consumes alcohol (V49.89) (Z78.9)    Home Meds  amLODIPine Besylate 5 MG Oral Tablet; TAKE 1 TABLET BY MOUTH DAILY    Allergies  No Known Drug Allergies    MEDICATIONS:  MEDICATIONS  (STANDING):  amLODIPine   Tablet 10 milliGRAM(s) Oral daily  dextrose 5%. 1000 milliLiter(s) (50 mL/Hr) IV Continuous <Continuous>  dextrose 5%. 1000 milliLiter(s) (100 mL/Hr) IV Continuous <Continuous>  dextrose 50% Injectable 25 Gram(s) IV Push once  dextrose 50% Injectable 12.5 Gram(s) IV Push once  dextrose 50% Injectable 25 Gram(s) IV Push once  glucagon  Injectable 1 milliGRAM(s) IntraMuscular once  insulin lispro (ADMELOG) corrective regimen sliding scale   SubCutaneous three times a day before meals  insulin lispro (ADMELOG) corrective regimen sliding scale   SubCutaneous at bedtime  sodium chloride 0.9%. 1000 milliLiter(s) (100 mL/Hr) IV Continuous <Continuous>        REVIEW OF SYSTEMS:  CONSTITUTIONAL: No chills  EYES/ENT: No visual changes;  No vertigo or throat pain   NECK: No pain or stiffness  RESPIRATORY: No cough, wheezing, hemoptysis; No shortness of breath  CARDIOVASCULAR: No chest pain or palpitations  GASTROINTESTINAL: No abdominal or epigastric pain. No nausea, vomiting, or hematemesis; No diarrhea or constipation. No melena or hematochezia.  GENITOURINARY: No dysuria, frequency or hematuria  NEUROLOGICAL: No numbness or weakness  SKIN: No itching, burning, rashes, or lesions   All other review of systems is negative unless indicated above  OBJECTIVE:    PHYSICAL EXAM:  Vital Signs Last 24 Hrs  T(C): 36.4 (2023 07:53), Max: 37.9 (2023 16:45)  T(F): 97.6 (2023 07:53), Max: 100.2 (2023 16:45)  HR: 115 (2023 07:53) (108 - 153)  BP: 155/84 (2023 07:53) (126/77 - 156/84)  BP(mean): 93 (2023 00:06) (88 - 93)  RR: 19 (2023 07:53) (18 - 20)  SpO2: 98% (2023 07:53) (94% - 98%)    Parameters below as of 2023 07:53  Patient On (Oxygen Delivery Method): room air      Constitutional: NAD, awake and alert, well-developed  HEENT: PERRLA, EOMI, Pharynx Clear  Neck: Supple, No JVD, No Lymphadenopathy  Respiratory: Breath sounds are clear bilaterally, No wheezing, rales or rhonchi  Cardiovascular: S1 and S2, regular rate and rhythm, no Murmurs, rubs or gallops  Gastrointestinal: Bowel Sounds present, Obese soft, nontender. No Hepatosplenomegaly No masses  Extremities: Without clubbing, cyanosis or edema  Vascular: 2+ peripheral pulses  Neurological: A/O x 3, no focal deficits  Musculoskeletal: FROM upper and lower extremities  Skin: No rashes    LABS:                         14.7   11.17 )-----------( 168      ( 2023 07:35 )             41.8     137  |  106  |  9   ----------------------------<  269<H>  3.7   |  26  |  0.90    Ca    8.7      2023 07:35  Mg     1.9         TPro  7.2  /  Alb  3.3  /  TBili  0.9  /  DBili  x   /  AST  46<H>  /  ALT  104<H>  /  AlkPhos  74      Urinalysis Basic - ( 2023 18:43 )  Color: Yellow / Appearance: Clear / S.005 / pH: x  Gluc: x / Ketone: Trace  / Bili: Negative / Urobili: Negative   Blood: x / Protein: 30 mg/dL / Nitrite: Negative   Leuk Esterase: Negative / RBC: 0-2 /HPF / WBC 0-2 /HPF   Sq Epi: x / Non Sq Epi: Occasional / Bacteria: Few    PT/INR - ( 2023 07:35 )   PT: 16.1 sec;   INR: 1.38 ratio    PTT - ( 2023 07:35 )  PTT:30.8 sec    THYROID PANEL    @ 17:19  TSH 1.03    CAPILLARY BLOOD GLUCOSE  POCT Blood Glucose.: 249 mg/dL (2023 07:46)  
Patient is a 47y old  Male who presents with a chief complaint of nausea / chills / weakness    HPI:  46 y/o Male with h/o HTN, DM type 2 was admitted on for increased weakness and tachycardia. Patient had nausea / chills / weakness, so he went to urgent care with symptoms, and found to have tachycardia. He was referred to ED for further evaluation. The patient states that he has baseline tachycardia in low 100s. Patient also admits to having nausea, but denies vomiting. States he had diarrhea about 4-6 weeks ago after taking antibiotics, but since then his BM's have been formes. Patient denies cough/ runny nose / sore throat / abdominal pain. In ER he was noted with low grade fever and leukocytosis. A concern about a possible infectious process was raised and that patient received vancomycin IV and zosyn.     PMH: as above  PSH: as above  Meds: per reconciliation sheet, noted below  MEDICATIONS  (STANDING):  amLODIPine   Tablet 10 milliGRAM(s) Oral daily  dextrose 5%. 1000 milliLiter(s) (50 mL/Hr) IV Continuous <Continuous>  dextrose 5%. 1000 milliLiter(s) (100 mL/Hr) IV Continuous <Continuous>  dextrose 50% Injectable 25 Gram(s) IV Push once  dextrose 50% Injectable 12.5 Gram(s) IV Push once  dextrose 50% Injectable 25 Gram(s) IV Push once  glucagon  Injectable 1 milliGRAM(s) IntraMuscular once  insulin lispro (ADMELOG) corrective regimen sliding scale   SubCutaneous three times a day before meals  insulin lispro (ADMELOG) corrective regimen sliding scale   SubCutaneous at bedtime  piperacillin/tazobactam IVPB.. 3.375 Gram(s) IV Intermittent every 8 hours  sodium chloride 0.9%. 1000 milliLiter(s) (100 mL/Hr) IV Continuous <Continuous>  vancomycin  IVPB 1000 milliGRAM(s) IV Intermittent every 12 hours    MEDICATIONS  (PRN):  acetaminophen     Tablet .. 650 milliGRAM(s) Oral every 6 hours PRN Temp greater or equal to 38C (100.4F), Mild Pain (1 - 3)  dextrose Oral Gel 15 Gram(s) Oral once PRN Blood Glucose LESS THAN 70 milliGRAM(s)/deciliter    Allergies    No Known Allergies    Intolerances      Social: no smoking, no alcohol, no illegal drugs; no recent travel, no exposure to TB  FAMILY HISTORY:    no history of premature cardiovascular disease in first degree relatives    ROS: the patient denies HA, no seizures, no dizziness, no sore throat, no nasal congestion, no blurry vision, no CP, no palpitations, no SOB, no cough, no abdominal pain, no diarrhea, had N/V, no dysuria, no leg pain, no claudication, no rash, no joint aches, no rectal pain or bleeding, no night sweats  All other systems reviewed and are negative    Vital Signs Last 24 Hrs  T(C): 37.1 (2023 02:22), Max: 37.9 (2023 16:45)  T(F): 98.7 (2023 02:22), Max: 100.2 (2023 16:45)  HR: 117 (2023 02:22) (108 - 153)  BP: 156/84 (2023 02:22) (126/77 - 156/84)  BP(mean): 93 (2023 00:06) (88 - 93)  RR: 18 (2023 02:22) (18 - 20)  SpO2: 98% (2023 02:22) (94% - 98%)    Parameters below as of 2023 02:22  Patient On (Oxygen Delivery Method): room air      Daily Height in cm: 170.18 (2023 16:21)    Daily Weight in k.3 (2023 02:22)    PE:    Constitutional:  No acute distress  HEENT: NC/AT, EOMI, PERRLA, conjunctivae clear; ears and nose atraumatic; pharynx benign  Neck: supple; thyroid not palpable  Back: no tenderness  Respiratory: respiratory effort normal; clear to auscultation  Cardiovascular: S1S2 regular, no murmurs  Abdomen: soft, not tender, not distended, positive BS; no liver or spleen organomegaly  Genitourinary: no suprapubic tenderness  Lymphatic: no LN palpable  Musculoskeletal: no muscle tenderness, no joint swelling or tenderness  Extremities: no pedal edema  Neurological/ Psychiatric: AxOx3, judgement and insight normal; moving all extremities  Skin: no rashes; no palpable lesions    Labs: all available labs reviewed                        15.0   16.21 )-----------( 197      ( 2023 17:19 )             43.1         141  |  109<H>  |  12  ----------------------------<  191<H>  3.4<L>   |  23  |  1.05    Ca    8.2<L>      2023 20:28  Mg     1.5         TPro  7.4  /  Alb  3.5  /  TBili  0.4  /  DBili  x   /  AST  69<H>  /  ALT  126<H>  /  AlkPhos  98       LIVER FUNCTIONS - ( 2023 17:19 )  Alb: 3.5 g/dL / Pro: 7.4 gm/dL / ALK PHOS: 98 U/L / ALT: 126 U/L / AST: 69 U/L / GGT: x           Urinalysis Basic - ( 2023 18:43 )    Color: Yellow / Appearance: Clear / S.005 / pH: x  Gluc: x / Ketone: Trace  / Bili: Negative / Urobili: Negative   Blood: x / Protein: 30 mg/dL / Nitrite: Negative   Leuk Esterase: Negative / RBC: 0-2 /HPF / WBC 0-2 /HPF   Sq Epi: x / Non Sq Epi: Occasional / Bacteria: Few      ( @ 18:43)  [facility]      Radiology: all available radiological tests reviewed    < from: CT Angio Chest PE Protocol w/ IV Cont (23 @ 17:50) >  No pulmonary embolism. Clear lungs.    < end of copied text >      Advanced directives addressed: full resuscitation
  CHIEF COMPLAINT: Patient is a 47y old  Male who presents with a chief complaint of nausea / chills / weakness (18 Jan 2023 07:09)      HPI: HPI:  46 y/o M w/ PMH of HTN, DM2, p/w tachycardia. Patient had nausea / chills / weakness, so he went to urgent care with symptoms, and found to have tachycardia, so was referred to ED. States he has baseline tachycardia in low 100s. Patient also admits to having nausea, but denies vomiting. States he had diarrhea about 4-6 weeks ago after taking antibiotics, but he hasn't had any diarrhea recently. Patient denies cough/ runny nose / sore throat / abdominal pain      PSH: Denies     Social Hx: Denies tobacco / etoh / drugs     Family Hx: Father - heart disease  (18 Jan 2023 04:05)      PMHx: PAST MEDICAL & SURGICAL HISTORY:  Hypertension            Soc Hx:     FAMILY HISTORY:      Allergies: Allergies    No Known Allergies    Intolerances          REVIEW OF SYSTEMS:    As above  No chest pain or shortness of breath  No lightheadeness or syncope  No leg swelling  + palpitations  No claudication-like symptoms    Vital Signs Last 24 Hrs  T(C): 37.1 (18 Jan 2023 02:22), Max: 37.9 (17 Jan 2023 16:45)  T(F): 98.7 (18 Jan 2023 02:22), Max: 100.2 (17 Jan 2023 16:45)  HR: 117 (18 Jan 2023 02:22) (108 - 153)  BP: 156/84 (18 Jan 2023 02:22) (126/77 - 156/84)  BP(mean): 93 (18 Jan 2023 00:06) (88 - 93)  RR: 18 (18 Jan 2023 02:22) (18 - 20)  SpO2: 98% (18 Jan 2023 02:22) (94% - 98%)    Parameters below as of 18 Jan 2023 02:22  Patient On (Oxygen Delivery Method): room air        I&O's Summary      CAPILLARY BLOOD GLUCOSE          PHYSICAL EXAM:   Patient in NAD  Neck: No JVD; Carotids:  2+ without bruits  Respiratory:  Clear to A&P  Cardiovascular: S1 and S2, regular rate and rhythm, no Murmurs, gallops or rubs  Gastrointestinal:  Soft, non-tender; BS positive  Extremities: No peripheral edema  Vascular: 2+ peripheral pulses  Neurological: A/O x 3, no focal deficits      MEDICATIONS:  MEDICATIONS  (STANDING):  amLODIPine   Tablet 10 milliGRAM(s) Oral daily  dextrose 5%. 1000 milliLiter(s) (50 mL/Hr) IV Continuous <Continuous>  dextrose 5%. 1000 milliLiter(s) (100 mL/Hr) IV Continuous <Continuous>  dextrose 50% Injectable 25 Gram(s) IV Push once  dextrose 50% Injectable 12.5 Gram(s) IV Push once  dextrose 50% Injectable 25 Gram(s) IV Push once  glucagon  Injectable 1 milliGRAM(s) IntraMuscular once  insulin lispro (ADMELOG) corrective regimen sliding scale   SubCutaneous three times a day before meals  insulin lispro (ADMELOG) corrective regimen sliding scale   SubCutaneous at bedtime  piperacillin/tazobactam IVPB.. 3.375 Gram(s) IV Intermittent every 8 hours  sodium chloride 0.9%. 1000 milliLiter(s) (100 mL/Hr) IV Continuous <Continuous>  vancomycin  IVPB 1000 milliGRAM(s) IV Intermittent every 12 hours      LABS: All Labs Reviewed:  Blood Culture:   BNP Serum Pro-Brain Natriuretic Peptide: 49 pg/mL (01-17 @ 20:28)    CBC             WBC Count: 16.21 K/uL (01-17 @ 17:19)              Hemoglobin: 15.0 g/dL (01-17 @ 17:19)              Hematocrit: 43.1 % (01-17 @ 17:19)              Mean Cell Volume: 93.7 fl (01-17 @ 17:19)              Platelet Count - Automated: 197 K/uL (01-17 @ 17:19)                            Cardiac markers   Troponin I, High Sensitivity (01.17.23 @ 17:19) Troponin I, High Sensitivity Result: 6.65  Troponin I, High Sensitivity (01.17.23 @ 20:28) Troponin I, High Sensitivity Result: 5.16                                     Chems        Sodium, Serum: 141 mmol/L (01-17 @ 20:28)  Sodium, Serum: 136 mmol/L (01-17 @ 17:19)          Potassium, Serum: 3.4 mmol/L (01-17 @ 20:28)  Potassium, Serum: 3.3 mmol/L (01-17 @ 17:19)          Blood Urea Nitrogen, Serum: 12 mg/dL (01-17 @ 20:28)  Blood Urea Nitrogen, Serum: 16 mg/dL (01-17 @ 17:19)          Creatinine 1.05  Creatinine 1.28          Magnesium, Serum: 1.5 mg/dL (01-17 @ 17:19)          Protein Total, Serum: 7.4 gm/dL (01-17 @ 17:19)                  Calcium, Total Serum: 8.2 mg/dL (01-17 @ 20:28)  Calcium, Total Serum: 9.1 mg/dL (01-17 @ 17:19)                  Bilirubin Total, Serum: 0.4 mg/dL (01-17 @ 17:19)          Alanine Aminotransferase (ALT/SGPT): 126 U/L (01-17 @ 17:19)          Aspartate Aminotransferase (AST/SGOT): 69 U/L (01-17 @ 17:19)                            RADIOLOGY:    EKG: ST; t waves not well seen    Telemetry: ST; normal QT    ECHO:

## 2023-01-18 NOTE — PATIENT PROFILE ADULT - FALL HARM RISK - UNIVERSAL INTERVENTIONS
Bed in lowest position, wheels locked, appropriate side rails in place/Call bell, personal items and telephone in reach/Instruct patient to call for assistance before getting out of bed or chair/Non-slip footwear when patient is out of bed/[place] to call system/Physically safe environment - no spills, clutter or unnecessary equipment/Purposeful Proactive Rounding/Room/bathroom lighting operational, light cord in reach

## 2023-01-19 ENCOUNTER — TRANSCRIPTION ENCOUNTER (OUTPATIENT)
Age: 48
End: 2023-01-19

## 2023-01-19 VITALS
OXYGEN SATURATION: 98 % | HEART RATE: 101 BPM | TEMPERATURE: 99 F | RESPIRATION RATE: 18 BRPM | SYSTOLIC BLOOD PRESSURE: 149 MMHG | DIASTOLIC BLOOD PRESSURE: 87 MMHG

## 2023-01-19 LAB
A1C WITH ESTIMATED AVERAGE GLUCOSE RESULT: 10.1 % — HIGH (ref 4–5.6)
CULTURE RESULTS: SIGNIFICANT CHANGE UP
ESTIMATED AVERAGE GLUCOSE: 243 MG/DL — HIGH (ref 68–114)
GLUCOSE BLDC GLUCOMTR-MCNC: 244 MG/DL — HIGH (ref 70–99)
SPECIMEN SOURCE: SIGNIFICANT CHANGE UP

## 2023-01-19 PROCEDURE — 93010 ELECTROCARDIOGRAM REPORT: CPT

## 2023-01-19 PROCEDURE — 99238 HOSP IP/OBS DSCHRG MGMT 30/<: CPT

## 2023-01-19 RX ORDER — MILK THISTLE 180 MG
1 CAPSULE ORAL
Qty: 0 | Refills: 0 | DISCHARGE

## 2023-01-19 RX ORDER — EMPAGLIFLOZIN 10 MG/1
1 TABLET, FILM COATED ORAL
Qty: 30 | Refills: 0
Start: 2023-01-19 | End: 2023-02-17

## 2023-01-19 RX ORDER — ACETYLCYSTEINE 200 MG/ML
1 VIAL (ML) MISCELLANEOUS
Qty: 0 | Refills: 0 | DISCHARGE

## 2023-01-19 RX ADMIN — Medication 2: at 08:12

## 2023-01-19 RX ADMIN — AMLODIPINE BESYLATE 10 MILLIGRAM(S): 2.5 TABLET ORAL at 09:17

## 2023-01-19 NOTE — PROGRESS NOTE ADULT - SUBJECTIVE AND OBJECTIVE BOX
CHIEF COMPLAINT: Patient is a 47y old  Male who presents with a chief complaint of nausea / chills / weakness (18 Jan 2023 07:09)      HPI: HPI:  46 y/o M w/ PMH of HTN, DM2, p/w tachycardia. Patient had nausea / chills / weakness, so he went to urgent care with symptoms, and found to have tachycardia, so was referred to ED. States he has baseline tachycardia in low 100s. Patient also admits to having nausea, but denies vomiting. States he had diarrhea about 4-6 weeks ago after taking antibiotics, but he hasn't had any diarrhea recently. Patient denies cough/ runny nose / sore throat / abdominal pain      PSH: Denies     Social Hx: Denies tobacco / etoh / drugs     Family Hx: Father - heart disease  (18 Jan 2023 04:05)      PMHx: PAST MEDICAL & SURGICAL HISTORY:  Hypertension            Soc Hx:     FAMILY HISTORY:      Allergies: Allergies    No Known Allergies    Intolerances          REVIEW OF SYSTEMS:    As above  No chest pain or shortness of breath  No lightheadeness or syncope  No leg swelling  + palpitations  No claudication-like symptoms    ICU Vital Signs Last 24 Hrs  T(C): 37.1 (18 Jan 2023 23:45), Max: 37.1 (18 Jan 2023 23:45)  T(F): 98.7 (18 Jan 2023 23:45), Max: 98.7 (18 Jan 2023 23:45)  HR: 97 (18 Jan 2023 23:45) (97 - 115)  BP: 134/76 (18 Jan 2023 23:45) (134/76 - 155/84)  BP(mean): --  ABP: --  ABP(mean): --  RR: 17 (18 Jan 2023 23:45) (17 - 19)  SpO2: 98% (18 Jan 2023 23:45) (98% - 98%)    O2 Parameters below as of 18 Jan 2023 23:45  Patient On (Oxygen Delivery Method): room air            Parameters below as of 18 Jan 2023 02:22  Patient On (Oxygen Delivery Method): room air        I&O's Summary      CAPILLARY BLOOD GLUCOSE          PHYSICAL EXAM:   Patient in NAD  Neck: No JVD; Carotids:  2+ without bruits  Respiratory:  Clear to A&P  Cardiovascular: S1 and S2, regular rate and rhythm, no Murmurs, gallops or rubs  Gastrointestinal:  Soft, non-tender; BS positive  Extremities: No peripheral edema  Vascular: 2+ peripheral pulses  Neurological: A/O x 3, no focal deficits      MEDICATIONS  (STANDING):  amLODIPine   Tablet 10 milliGRAM(s) Oral daily  dextrose 5%. 1000 milliLiter(s) (50 mL/Hr) IV Continuous <Continuous>  dextrose 5%. 1000 milliLiter(s) (100 mL/Hr) IV Continuous <Continuous>  dextrose 50% Injectable 25 Gram(s) IV Push once  dextrose 50% Injectable 12.5 Gram(s) IV Push once  dextrose 50% Injectable 25 Gram(s) IV Push once  glucagon  Injectable 1 milliGRAM(s) IntraMuscular once  insulin lispro (ADMELOG) corrective regimen sliding scale   SubCutaneous three times a day before meals  insulin lispro (ADMELOG) corrective regimen sliding scale   SubCutaneous at bedtime  sodium chloride 0.9%. 1000 milliLiter(s) (100 mL/Hr) IV Continuous <Continuous>        LABS: All Labs Reviewed:  Blood Culture:   BNP Serum Pro-Brain Natriuretic Peptide: 49 pg/mL (01-17 @ 20:28)    CBC             WBC Count: 16.21 K/uL (01-17 @ 17:19)              Hemoglobin: 15.0 g/dL (01-17 @ 17:19)              Hematocrit: 43.1 % (01-17 @ 17:19)              Mean Cell Volume: 93.7 fl (01-17 @ 17:19)              Platelet Count - Automated: 197 K/uL (01-17 @ 17:19)                        14.7   11.17 )-----------( 168      ( 18 Jan 2023 07:35 )             41.8                               Cardiac markers   Troponin I, High Sensitivity (01.17.23 @ 17:19) Troponin I, High Sensitivity Result: 6.65  Troponin I, High Sensitivity (01.17.23 @ 20:28) Troponin I, High Sensitivity Result: 5.16                                     Chems        Sodium, Serum: 141 mmol/L (01-17 @ 20:28)  Sodium, Serum: 136 mmol/L (01-17 @ 17:19)          Potassium, Serum: 3.4 mmol/L (01-17 @ 20:28)  Potassium, Serum: 3.3 mmol/L (01-17 @ 17:19)          Blood Urea Nitrogen, Serum: 12 mg/dL (01-17 @ 20:28)  Blood Urea Nitrogen, Serum: 16 mg/dL (01-17 @ 17:19)          Creatinine 1.05  Creatinine 1.28          Magnesium, Serum: 1.5 mg/dL (01-17 @ 17:19)          Protein Total, Serum: 7.4 gm/dL (01-17 @ 17:19)                  Calcium, Total Serum: 8.2 mg/dL (01-17 @ 20:28)  Calcium, Total Serum: 9.1 mg/dL (01-17 @ 17:19)                  Bilirubin Total, Serum: 0.4 mg/dL (01-17 @ 17:19)          Alanine Aminotransferase (ALT/SGPT): 126 U/L (01-17 @ 17:19)          Aspartate Aminotransferase (AST/SGOT): 69 U/L (01-17 @ 17:19)    01-18    137  |  106  |  9   ----------------------------<  269<H>  3.7   |  26  |  0.90    Ca    8.7      18 Jan 2023 07:35  Mg     1.9     01-18    TPro  7.2  /  Alb  3.3  /  TBili  0.9  /  DBili  x   /  AST  46<H>  /  ALT  104<H>  /  AlkPhos  74  01-18                              RADIOLOGY:    EKG: ST; t waves not well seen    Telemetry: ST; normal QT    ECHO:     
Date of service: 23 @ 09:29    Lying in bed in NAD  Feels better  No fever overnight and this AM  No N/V    ROS: no fever or chills; denies dizziness, no HA, no SOB or cough, no abdominal pain, no diarrhea or constipation; no dysuria, no legs pain, no rashes    MEDICATIONS  (STANDING):  amLODIPine   Tablet 10 milliGRAM(s) Oral daily  dextrose 5%. 1000 milliLiter(s) (50 mL/Hr) IV Continuous <Continuous>  dextrose 5%. 1000 milliLiter(s) (100 mL/Hr) IV Continuous <Continuous>  dextrose 50% Injectable 25 Gram(s) IV Push once  dextrose 50% Injectable 12.5 Gram(s) IV Push once  dextrose 50% Injectable 25 Gram(s) IV Push once  glucagon  Injectable 1 milliGRAM(s) IntraMuscular once  insulin lispro (ADMELOG) corrective regimen sliding scale   SubCutaneous three times a day before meals  insulin lispro (ADMELOG) corrective regimen sliding scale   SubCutaneous at bedtime  sodium chloride 0.9%. 1000 milliLiter(s) (100 mL/Hr) IV Continuous <Continuous>    Vital Signs Last 24 Hrs  T(C): 37.1 (2023 08:40), Max: 37.1 (2023 23:45)  T(F): 98.8 (2023 08:40), Max: 98.8 (2023 08:40)  HR: 101 (2023 08:40) (97 - 101)  BP: 149/87 (2023 08:40) (134/76 - 149/87)  BP(mean): --  RR: 18 (2023 08:40) (17 - 18)  SpO2: 98% (2023 08:40) (98% - 98%)    Parameters below as of 2023 08:40  Patient On (Oxygen Delivery Method): room air     Physical exam:    Constitutional:  No acute distress  HEENT: NC/AT, EOMI, PERRLA, conjunctivae clear; ears and nose atraumatic  Neck: supple; thyroid not palpable  Back: no tenderness  Respiratory: respiratory effort normal; clear to auscultation  Cardiovascular: S1S2 regular, no murmurs  Abdomen: soft, not tender, not distended, positive BS  Genitourinary: no suprapubic tenderness  Lymphatic: no LN palpable  Musculoskeletal: no muscle tenderness, no joint swelling or tenderness  Extremities: no pedal edema  Neurological/ Psychiatric: AxOx3, judgement and insight normal; moving all extremities  Skin: no rashes; no palpable lesions    Labs: reviewed                        14.7   11.17 )-----------( 168      ( 2023 07:35 )             41.8         137  |  106  |  9   ----------------------------<  269<H>  3.7   |  26  |  0.90    Ca    8.7      2023 07:35  Mg     1.9         TPro  7.2  /  Alb  3.3  /  TBili  0.9  /  DBili  x   /  AST  46<H>  /  ALT  104<H>  /  AlkPhos  74      D-Dimer Assay, Quantitative: 734 ng/mL DDU (23 @ 17:19)                        15.0   16.21 )-----------( 197      ( 2023 17:19 )             43.1         141  |  109<H>  |  12  ----------------------------<  191<H>  3.4<L>   |  23  |  1.05    Ca    8.2<L>      2023 20:28  Mg     1.5         TPro  7.4  /  Alb  3.5  /  TBili  0.4  /  DBili  x   /  AST  69<H>  /  ALT  126<H>  /  AlkPhos  98       LIVER FUNCTIONS - ( 2023 17:19 )  Alb: 3.5 g/dL / Pro: 7.4 gm/dL / ALK PHOS: 98 U/L / ALT: 126 U/L / AST: 69 U/L / GGT: x           Urinalysis Basic - ( 2023 18:43 )    Color: Yellow / Appearance: Clear / S.005 / pH: x  Gluc: x / Ketone: Trace  / Bili: Negative / Urobili: Negative   Blood: x / Protein: 30 mg/dL / Nitrite: Negative   Leuk Esterase: Negative / RBC: 0-2 /HPF / WBC 0-2 /HPF   Sq Epi: x / Non Sq Epi: Occasional / Bacteria: Few    ( @ 18:43)  [facility]      Culture - Blood (collected 2023 19:01)  Source: .Blood None  Preliminary Report (2023 03:02):    No growth to date.    Culture - Blood (collected 2023 19:00)  Source: .Blood None  Preliminary Report (2023 03:02):    No growth to date.    Culture - Urine (collected 2023 18:43)  Source: Clean Catch None  Final Report (2023 00:16):    <10,000 CFU/mL Normal Urogenital Lola    Radiology: all available radiological tests reviewed    < from: CT Angio Chest PE Protocol w/ IV Cont (23 @ 17:50) >  No pulmonary embolism. Clear lungs.    < end of copied text >      Advanced directives addressed: full resuscitation

## 2023-01-19 NOTE — DISCHARGE NOTE PROVIDER - CARE PROVIDER_API CALL
Cornelius Rivera)  Family Medicine  [address], [address], [place] [address]  Phone: [phone]  Fax: [phone]  Follow Up Time:

## 2023-01-19 NOTE — DISCHARGE NOTE PROVIDER - NSDCMRMEDTOKEN_GEN_ALL_CORE_FT
amlodipine-olmesartan 10 mg-40 mg oral tablet: 1 tab(s) orally once a day  Jardiance 10 mg oral tablet: 1 tab(s) orally once a day

## 2023-01-19 NOTE — DISCHARGE NOTE PROVIDER - NSDCPNSUBOBJ_GEN_ALL_CORE
SUBJECTIVE:    CHIEF COMPLAINT:  Patient is a 47y old  Male who presents with a chief complaint of nausea / chills / weakness (2023 09:28)      HPI:  48 y/o M w/ PMH of HTN, DM2, p/w tachycardia. Patient had nausea / chills / weakness, so he went to urgent care with symptoms, and found to have tachycardia, so was referred to ED. States he has baseline tachycardia in low 100s. Patient also admits to having nausea, but denies vomiting. States he had diarrhea about 4-6 weeks ago after taking antibiotics, but he hasn't had any diarrhea recently. Patient denies cough/ runny nose / sore throat / abdominal pain      Interval HPI and Overnight Events: Pt feeling well. no complaints    REVIEW OF SYSTEMS:  CONSTITUTIONAL: No weakness, fevers or chills  EYES/ENT: No visual changes;  No vertigo or throat pain   NECK: No pain or stiffness  RESPIRATORY: No cough, wheezing, hemoptysis; No shortness of breath  CARDIOVASCULAR: No chest pain or palpitations  GASTROINTESTINAL: No abdominal or epigastric pain. No nausea, vomiting, or hematemesis; No diarrhea or constipation. No melena or hematochezia.  GENITOURINARY: No dysuria, frequency or hematuria  NEUROLOGICAL: No numbness or weakness  SKIN: No itching, burning, rashes, or lesions   All other review of systems is negative unless indicated above    OBJECTIVE    PHYSICAL EXAM:  Vital Signs Last 24 Hrs  T(C): 37.1 (2023 08:40), Max: 37.1 (2023 23:45)  T(F): 98.8 (2023 08:40), Max: 98.8 (2023 08:40)  HR: 101 (2023 08:40) (97 - 101)  BP: 149/87 (2023 08:40) (134/76 - 149/87)  BP(mean): --  RR: 18 (2023 08:40) (17 - 18)  SpO2: 98% (2023 08:40) (98% - 98%)    Parameters below as of 2023 08:40  Patient On (Oxygen Delivery Method): room air      Constitutional: NAD, awake and alert, well-developed  HEENT: PERR, EOMI, Normal Hearing, MMM  Neck: Soft and supple, No LAD, No JVD  Respiratory: Breath sounds are clear bilaterally, No wheezing, rales or rhonchi  Cardiovascular: S1 and S2, regular rate and rhythm, no Murmurs, gallops or rubs  Gastrointestinal: Bowel Sounds present, soft, nontender, nondistended, no guarding, no rebound  Extremities: No peripheral edema  Vascular: 2+ peripheral pulses  Neurological: A/O x 3, no focal deficits  Musculoskeletal: 5/5 strength b/l upper and lower extremities  Skin: No rashes    MEDICATIONS  (STANDING):  amLODIPine   Tablet 10 milliGRAM(s) Oral daily  dextrose 5%. 1000 milliLiter(s) (50 mL/Hr) IV Continuous <Continuous>  dextrose 5%. 1000 milliLiter(s) (100 mL/Hr) IV Continuous <Continuous>  dextrose 50% Injectable 25 Gram(s) IV Push once  dextrose 50% Injectable 12.5 Gram(s) IV Push once  dextrose 50% Injectable 25 Gram(s) IV Push once  glucagon  Injectable 1 milliGRAM(s) IntraMuscular once  insulin lispro (ADMELOG) corrective regimen sliding scale   SubCutaneous three times a day before meals  insulin lispro (ADMELOG) corrective regimen sliding scale   SubCutaneous at bedtime  sodium chloride 0.9%. 1000 milliLiter(s) (100 mL/Hr) IV Continuous <Continuous>      LABS:                         14.7   11.17 )-----------( 168      ( 2023 07:35 )             41.8         137  |  106  |  9   ----------------------------<  269<H>  3.7   |  26  |  0.90    Ca    8.7      2023 07:35  Mg     1.9         TPro  7.2  /  Alb  3.3  /  TBili  0.9  /  DBili  x   /  AST  46<H>  /  ALT  104<H>  /  AlkPhos  74      Urinalysis Basic - ( 2023 18:43 )    Color: Yellow / Appearance: Clear / S.005 / pH: x  Gluc: x / Ketone: Trace  / Bili: Negative / Urobili: Negative   Blood: x / Protein: 30 mg/dL / Nitrite: Negative   Leuk Esterase: Negative / RBC: 0-2 /HPF / WBC 0-2 /HPF   Sq Epi: x / Non Sq Epi: Occasional / Bacteria: Few      PT/INR - ( 2023 07:35 )   PT: 16.1 sec;   INR: 1.38 ratio    PTT - ( 2023 07:35 )  PTT:30.8 sec    Specimen Source .Blood None    @ 19:01  Culture Results  No growth to date.  Specimen Source .Blood None    @ 19:00  Culture Results  No growth to date.  Specimen Source Clean Catch None    @ 18:43  Culture Results  <10,000 CFU/mL Normal Urogenital Lola    URINE CULTURE     @ 19:01    CULTURE RESULTS   No growth to date.  URINE CULTURE     @ 19:00    CULTURE RESULTS   No growth to date.  URINE CULTURE     @ 18:43    CULTURE RESULTS   <10,000 CFU/mL Normal Urogenital Lola    Lipid Panel       @ 07:35  Chol  136  HDL 37  TRIG 81    CAPILLARY BLOOD GLUCOSE  POCT Blood Glucose.: 244 mg/dL (2023 07:46)  POCT Blood Glucose.: 256 mg/dL (2023 21:08)  POCT Blood Glucose.: 229 mg/dL (2023 12:02)      Assessment and Recommendation:   · Assessment      48 y/o M w/ PMH of HTN, DM2, p/w tachycardia    *Lactic Acidosis with elevated WBC and tachycardia sepsis 2/2 viral syndrome   -Patient given Zosyn and vanco in ED ID holding due to negative cultures   -RVP / COVID negative  -UA negative  -CT chest: Negative for PNA   -CT abdomen hepatomegally and fatty liver    -Lactic acidosis resolved after 3L fluids  -IVF     *Tachycardia  -Tachycardia has improved -> possible sign of infection?   -Patient states he has a baseline HR of low 100s, but on EKG HR is 149   -Cardio consult appreciated. Not concerned re QTc   -Also counseled on avoiding taking non-FDA approved medications / alternative medications, due to unknown side effects     *DM2  -Humalog ISS  -Diabetic diet  -HbA1c  -Hold off on metformin for now. Start on Jardiance.  Follow LFT's. Will re-evaluate as out patient for metformin and statins    *Hypokalemia - resolved      *HTN  -C/w home meds     *DVT ppx   -SCDs

## 2023-01-19 NOTE — DISCHARGE NOTE NURSING/CASE MANAGEMENT/SOCIAL WORK - PATIENT PORTAL LINK FT
You can access the FollowMyHealth Patient Portal offered by [facility] by registering at the following website: [email or web address]. By joining [employer]’s FollowMyHealth portal, you will also be able to view your health information using other applications (apps) compatible with our system.

## 2023-01-19 NOTE — DISCHARGE NOTE PROVIDER - HOSPITAL COURSE
Pt admitted with presumed sepsis secondary to viral etiology. Pt admitted with lactic acidosis, leukocytosis, tachycardia and uncontrolled Type 2 DM. Given initial antibiotics. Seen by ID. CT chest negative. CT abdomen perhaps some incidental mild diverticulitis. Pt with hepatomegaly, mild elevated LFT's and fatty liver disease. Lactate quickly improved as did WBC. Seen by cardiology and ID. Cultures remained negative and pt felt to be safe for discharge with close follow up. Will probably need statins and metformin but will hold off for now in light of elevated LFT's and recent lactic acidosis. Will start on Jardiance and follow.

## 2023-01-19 NOTE — DISCHARGE NOTE PROVIDER - NSDCCPCAREPLAN_GEN_ALL_CORE_FT
PRINCIPAL DISCHARGE DIAGNOSIS  Diagnosis: Sepsis, unspecified organism  Assessment and Plan of Treatment:       SECONDARY DISCHARGE DIAGNOSES  Diagnosis: Tachycardia  Assessment and Plan of Treatment:     Diagnosis: Acidosis, lactic  Assessment and Plan of Treatment:     Diagnosis: Nonalcoholic fatty liver disease  Assessment and Plan of Treatment:     Diagnosis: Hepatomegaly  Assessment and Plan of Treatment:     Diagnosis: Type 2 diabetes mellitus  Assessment and Plan of Treatment:     Diagnosis: Obesity  Assessment and Plan of Treatment:

## 2023-01-19 NOTE — PROGRESS NOTE ADULT - ASSESSMENT
47 year old man with the above PMH admitted with sinus tachykardia possibly related to an infection for which he is on IV antibiotics.  His QTc is normal and his cardiac status is stable.  No cardiac work up at the present time.    1/19/23:  Patient remains stable from a cardiac standpoint.  No further work up required
46 y/o Male with h/o HTN, DM type 2 was admitted on 11/17for increased weakness and tachycardia. Patient had nausea / chills / weakness, so he went to urgent care with symptoms, and found to have tachycardia. He was referred to ED for further evaluation. The patient states that he has baseline tachycardia in low 100s. Patient also admits to having nausea, but denies vomiting. States he had diarrhea about 4-6 weeks ago after taking antibiotics, but since then his BM's have been formes. Patient denies cough/ runny nose / sore throat / abdominal pain. In ER he was noted with low grade fever and leukocytosis. A concern about a possible infectious process was raised and that patient received vancomycin IV and zosyn.     1. Low grade fever resolved. Nausea ?Acute gastritis. Probable viral syndrome resolving. Tachycardia. ?cause.   -leukocytosis is much improved  -concern for sepsis was raised - s/p zosyn and vancomycin IV  -repeat lactate overnight is WNL  -RVP is negative   -cultures are negative to date   -lungs are clear on CT chest; no pyuria noted  -obtain BC x 2, urine c/s  -no clear source of an infection process  -would continue to observe off abx therapy  -f/u cultures  -monitor temps  -f/u CBC  -supportive care  2. Other issues:   -care per medicine

## 2023-01-23 DIAGNOSIS — R16.0 HEPATOMEGALY, NOT ELSEWHERE CLASSIFIED: ICD-10-CM

## 2023-01-23 DIAGNOSIS — E87.20 ACIDOSIS, UNSPECIFIED: ICD-10-CM

## 2023-01-23 DIAGNOSIS — E11.9 TYPE 2 DIABETES MELLITUS WITHOUT COMPLICATIONS: ICD-10-CM

## 2023-01-23 DIAGNOSIS — I10 ESSENTIAL (PRIMARY) HYPERTENSION: ICD-10-CM

## 2023-01-23 DIAGNOSIS — Z91.14 PATIENT'S OTHER NONCOMPLIANCE WITH MEDICATION REGIMEN: ICD-10-CM

## 2023-01-23 DIAGNOSIS — Z91.119 PATIENT'S NONCOMPLIANCE WITH DIETARY REGIMEN DUE TO UNSPECIFIED REASON: ICD-10-CM

## 2023-01-23 DIAGNOSIS — B34.9 VIRAL INFECTION, UNSPECIFIED: ICD-10-CM

## 2023-01-23 DIAGNOSIS — E87.6 HYPOKALEMIA: ICD-10-CM

## 2023-01-23 DIAGNOSIS — E66.9 OBESITY, UNSPECIFIED: ICD-10-CM

## 2023-01-23 DIAGNOSIS — A41.9 SEPSIS, UNSPECIFIED ORGANISM: ICD-10-CM

## 2023-01-23 DIAGNOSIS — K76.0 FATTY (CHANGE OF) LIVER, NOT ELSEWHERE CLASSIFIED: ICD-10-CM

## 2023-01-23 LAB
CULTURE RESULTS: SIGNIFICANT CHANGE UP
CULTURE RESULTS: SIGNIFICANT CHANGE UP
SPECIMEN SOURCE: SIGNIFICANT CHANGE UP
SPECIMEN SOURCE: SIGNIFICANT CHANGE UP

## 2023-01-26 ENCOUNTER — APPOINTMENT (OUTPATIENT)
Dept: FAMILY MEDICINE | Facility: CLINIC | Age: 48
End: 2023-01-26
Payer: COMMERCIAL

## 2023-01-26 VITALS
WEIGHT: 239 LBS | HEIGHT: 67 IN | TEMPERATURE: 97.3 F | HEART RATE: 99 BPM | DIASTOLIC BLOOD PRESSURE: 60 MMHG | SYSTOLIC BLOOD PRESSURE: 110 MMHG | OXYGEN SATURATION: 98 % | BODY MASS INDEX: 37.51 KG/M2

## 2023-01-26 DIAGNOSIS — K52.9 NONINFECTIVE GASTROENTERITIS AND COLITIS, UNSPECIFIED: ICD-10-CM

## 2023-01-26 DIAGNOSIS — L02.214 CUTANEOUS ABSCESS OF GROIN: ICD-10-CM

## 2023-01-26 DIAGNOSIS — Z20.822 CONTACT WITH AND (SUSPECTED) EXPOSURE TO COVID-19: ICD-10-CM

## 2023-01-26 DIAGNOSIS — R19.7 DIARRHEA, UNSPECIFIED: ICD-10-CM

## 2023-01-26 DIAGNOSIS — S63.501A UNSPECIFIED SPRAIN OF RIGHT WRIST, INITIAL ENCOUNTER: ICD-10-CM

## 2023-01-26 DIAGNOSIS — L02.31 CUTANEOUS ABSCESS OF BUTTOCK: ICD-10-CM

## 2023-01-26 PROCEDURE — 99495 TRANSJ CARE MGMT MOD F2F 14D: CPT | Mod: 25

## 2023-01-26 PROCEDURE — 36415 COLL VENOUS BLD VENIPUNCTURE: CPT

## 2023-01-26 RX ORDER — AMLODIPINE BESYLATE 5 MG/1
5 TABLET ORAL
Qty: 90 | Refills: 0 | Status: DISCONTINUED | COMMUNITY
Start: 2021-03-04 | End: 2023-01-26

## 2023-01-26 NOTE — HISTORY OF PRESENT ILLNESS
[Post-hospitalization from ___ Hospital] : Post-hospitalization from [unfilled] Hospital [Admitted on: ___] : The patient was admitted on [unfilled] [Discharged on ___] : discharged on [unfilled] [FreeTextEntry2] : Pt is here for hospital follow up.\par Patient admitted with uncontrolled DM, Lactic acidoses, and fatigue\par Taking the Jardiance\par Drastically cut down on carbs and sweets\par Checking glucoses twice daily 106-123\par Hgb A1C was 10.1\par LFT's elevated in hospital as well

## 2023-01-31 NOTE — ED ADULT NURSE NOTE - CAS EDN DISCHARGE ASSESSMENT
Alert and oriented to person, place and time Dupixent Pregnancy And Lactation Text: This medication likely crosses the placenta but the risk for the fetus is uncertain. This medication is excreted in breast milk.

## 2023-02-01 LAB
ALBUMIN SERPL ELPH-MCNC: 4.6 G/DL
ALP BLD-CCNC: 89 U/L
ALT SERPL-CCNC: 158 U/L
ANION GAP SERPL CALC-SCNC: 17 MMOL/L
AST SERPL-CCNC: 79 U/L
BILIRUB SERPL-MCNC: 0.3 MG/DL
BUN SERPL-MCNC: 21 MG/DL
CALCIUM SERPL-MCNC: 10 MG/DL
CHLORIDE SERPL-SCNC: 102 MMOL/L
CO2 SERPL-SCNC: 22 MMOL/L
CREAT SERPL-MCNC: 0.89 MG/DL
EGFR: 106 ML/MIN/1.73M2
GLUCOSE SERPL-MCNC: 82 MG/DL
POTASSIUM SERPL-SCNC: 4.7 MMOL/L
PROT SERPL-MCNC: 7.9 G/DL
SODIUM SERPL-SCNC: 140 MMOL/L

## 2023-02-03 ENCOUNTER — NON-APPOINTMENT (OUTPATIENT)
Age: 48
End: 2023-02-03

## 2023-02-23 ENCOUNTER — APPOINTMENT (OUTPATIENT)
Dept: FAMILY MEDICINE | Facility: CLINIC | Age: 48
End: 2023-02-23
Payer: COMMERCIAL

## 2023-02-23 ENCOUNTER — TRANSCRIPTION ENCOUNTER (OUTPATIENT)
Age: 48
End: 2023-02-23

## 2023-02-23 VITALS
SYSTOLIC BLOOD PRESSURE: 122 MMHG | TEMPERATURE: 97.3 F | WEIGHT: 239 LBS | OXYGEN SATURATION: 95 % | BODY MASS INDEX: 37.51 KG/M2 | DIASTOLIC BLOOD PRESSURE: 70 MMHG | HEIGHT: 67 IN | HEART RATE: 88 BPM

## 2023-02-23 LAB
BILIRUB UR QL STRIP: NORMAL
CLARITY UR: CLEAR
COLLECTION METHOD: NORMAL
GLUCOSE UR-MCNC: ABNORMAL
HCG UR QL: 0.2 EU/DL
HGB UR QL STRIP.AUTO: NORMAL
KETONES UR-MCNC: ABNORMAL
LEUKOCYTE ESTERASE UR QL STRIP: NORMAL
NITRITE UR QL STRIP: NORMAL
PH UR STRIP: 5
PROT UR STRIP-MCNC: NORMAL
SP GR UR STRIP: 1.03

## 2023-02-23 PROCEDURE — 81003 URINALYSIS AUTO W/O SCOPE: CPT | Mod: QW

## 2023-02-23 PROCEDURE — 99213 OFFICE O/P EST LOW 20 MIN: CPT | Mod: 25

## 2023-02-23 PROCEDURE — 36415 COLL VENOUS BLD VENIPUNCTURE: CPT

## 2023-02-23 NOTE — HISTORY OF PRESENT ILLNESS
[FreeTextEntry6] : Pt is here for a follow up for liver enzymes. \par Has been watching his carbs. Lost about 25 lbs. \par Taking the medication

## 2023-02-24 LAB
ALBUMIN SERPL ELPH-MCNC: 4.7 G/DL
ALP BLD-CCNC: 89 U/L
ALT SERPL-CCNC: 38 U/L
ANION GAP SERPL CALC-SCNC: 14 MMOL/L
AST SERPL-CCNC: 29 U/L
BILIRUB SERPL-MCNC: 0.4 MG/DL
BUN SERPL-MCNC: 15 MG/DL
CALCIUM SERPL-MCNC: 10.1 MG/DL
CHLORIDE SERPL-SCNC: 102 MMOL/L
CO2 SERPL-SCNC: 25 MMOL/L
CREAT SERPL-MCNC: 0.84 MG/DL
CREAT SPEC-SCNC: 95 MG/DL
EGFR: 108 ML/MIN/1.73M2
GLUCOSE SERPL-MCNC: 88 MG/DL
MICROALBUMIN 24H UR DL<=1MG/L-MCNC: <1.2 MG/DL
MICROALBUMIN/CREAT 24H UR-RTO: NORMAL MG/G
POTASSIUM SERPL-SCNC: 4.3 MMOL/L
PROT SERPL-MCNC: 7.6 G/DL
SODIUM SERPL-SCNC: 141 MMOL/L

## 2023-02-26 LAB
CHOLEST SERPL-MCNC: 209 MG/DL
ESTIMATED AVERAGE GLUCOSE: 169 MG/DL
HBA1C MFR BLD HPLC: 7.5 %
HDLC SERPL-MCNC: 40 MG/DL
LDLC SERPL CALC-MCNC: 137 MG/DL
NONHDLC SERPL-MCNC: 169 MG/DL
TRIGL SERPL-MCNC: 159 MG/DL

## 2023-03-09 ENCOUNTER — OFFICE (OUTPATIENT)
Dept: URBAN - METROPOLITAN AREA CLINIC 104 | Facility: CLINIC | Age: 48
Setting detail: OPHTHALMOLOGY
End: 2023-03-09

## 2023-03-09 DIAGNOSIS — Z01.11: ICD-10-CM

## 2023-03-09 PROCEDURE — 90002 FAA EXAMINATION: CPT | Performed by: SPECIALIST

## 2023-03-09 ASSESSMENT — VISUAL ACUITY
OS_BCVA: 20/20-1
OD_BCVA: 20/20-1

## 2023-03-23 ENCOUNTER — APPOINTMENT (OUTPATIENT)
Dept: FAMILY MEDICINE | Facility: CLINIC | Age: 48
End: 2023-03-23
Payer: COMMERCIAL

## 2023-03-23 VITALS
SYSTOLIC BLOOD PRESSURE: 152 MMHG | WEIGHT: 222 LBS | BODY MASS INDEX: 34.84 KG/M2 | TEMPERATURE: 97.8 F | OXYGEN SATURATION: 96 % | HEIGHT: 67 IN | DIASTOLIC BLOOD PRESSURE: 60 MMHG | HEART RATE: 100 BPM

## 2023-03-23 DIAGNOSIS — Z00.00 ENCOUNTER FOR GENERAL ADULT MEDICAL EXAMINATION W/OUT ABNORMAL FINDINGS: ICD-10-CM

## 2023-03-23 DIAGNOSIS — Z86.69 PERSONAL HISTORY OF OTHER DISEASES OF THE NERVOUS SYSTEM AND SENSE ORGANS: ICD-10-CM

## 2023-03-23 PROCEDURE — 99214 OFFICE O/P EST MOD 30 MIN: CPT

## 2023-03-23 NOTE — HISTORY OF PRESENT ILLNESS
[No episodes] : No hypoglycemic episodes since the last visit. [Check glucose ___ x/day] : Patient checks  blood glucose [unfilled]  times a day [Understanding of foot care] : Patient expressed understanding of foot care [No Retinopathy] : No retinopathy [Most Recent A1C: ___] : Most recent A1C was [unfilled] [Target A1C:  ___] : Target A1C is [unfilled] [FreeTextEntry6] : Pt is here for med consult.\par Pt stopped taking his BP marcos and his DM meds becasuse he believes at this point his diet is so good that he will no longer need them [EyeExamDate] : 02/23

## 2023-03-23 NOTE — PLAN
[FreeTextEntry1] : Advised at length\par Strongly suggested going back on the meds and keeping a close eye on the BP \par Follow A1C \par

## 2023-04-09 ENCOUNTER — RX RENEWAL (OUTPATIENT)
Age: 48
End: 2023-04-09

## 2023-04-27 ENCOUNTER — APPOINTMENT (OUTPATIENT)
Dept: FAMILY MEDICINE | Facility: CLINIC | Age: 48
End: 2023-04-27
Payer: COMMERCIAL

## 2023-04-27 VITALS
SYSTOLIC BLOOD PRESSURE: 142 MMHG | DIASTOLIC BLOOD PRESSURE: 80 MMHG | TEMPERATURE: 97.6 F | WEIGHT: 222 LBS | HEART RATE: 102 BPM | OXYGEN SATURATION: 97 % | BODY MASS INDEX: 34.77 KG/M2

## 2023-04-27 PROCEDURE — 99214 OFFICE O/P EST MOD 30 MIN: CPT | Mod: 25

## 2023-04-27 PROCEDURE — 81003 URINALYSIS AUTO W/O SCOPE: CPT | Mod: QW

## 2023-04-27 PROCEDURE — 36415 COLL VENOUS BLD VENIPUNCTURE: CPT

## 2023-04-27 NOTE — HISTORY OF PRESENT ILLNESS
[Diabetes Mellitus] : Diabetes Mellitus [Hypertension] : Hypertension [FreeTextEntry6] : pt is here for dm check\par Pt has not been taking the BP meds or the jardiance\par Has been watching his carbs and checking his sugars.

## 2023-04-28 LAB
ALBUMIN SERPL ELPH-MCNC: 4.7 G/DL
ALP BLD-CCNC: 99 U/L
ALT SERPL-CCNC: 34 U/L
ANION GAP SERPL CALC-SCNC: 16 MMOL/L
AST SERPL-CCNC: 28 U/L
BILIRUB SERPL-MCNC: 0.4 MG/DL
BUN SERPL-MCNC: 13 MG/DL
CALCIUM SERPL-MCNC: 10.1 MG/DL
CHLORIDE SERPL-SCNC: 103 MMOL/L
CHOLEST SERPL-MCNC: 195 MG/DL
CO2 SERPL-SCNC: 22 MMOL/L
CREAT SERPL-MCNC: 0.88 MG/DL
CREAT SPEC-SCNC: 138 MG/DL
EGFR: 107 ML/MIN/1.73M2
ESTIMATED AVERAGE GLUCOSE: 123 MG/DL
GLUCOSE SERPL-MCNC: 116 MG/DL
HBA1C MFR BLD HPLC: 5.9 %
HDLC SERPL-MCNC: 45 MG/DL
LDLC SERPL CALC-MCNC: 92 MG/DL
MICROALBUMIN 24H UR DL<=1MG/L-MCNC: <1.2 MG/DL
MICROALBUMIN/CREAT 24H UR-RTO: NORMAL MG/G
NONHDLC SERPL-MCNC: 150 MG/DL
POTASSIUM SERPL-SCNC: 3.9 MMOL/L
PROT SERPL-MCNC: 7.5 G/DL
SODIUM SERPL-SCNC: 142 MMOL/L
TRIGL SERPL-MCNC: 291 MG/DL

## 2023-05-18 ENCOUNTER — APPOINTMENT (OUTPATIENT)
Dept: FAMILY MEDICINE | Facility: CLINIC | Age: 48
End: 2023-05-18
Payer: COMMERCIAL

## 2023-05-18 VITALS
SYSTOLIC BLOOD PRESSURE: 132 MMHG | TEMPERATURE: 98.1 F | BODY MASS INDEX: 34.84 KG/M2 | WEIGHT: 222 LBS | HEART RATE: 109 BPM | HEIGHT: 67 IN | OXYGEN SATURATION: 96 % | DIASTOLIC BLOOD PRESSURE: 70 MMHG

## 2023-05-18 PROCEDURE — 99214 OFFICE O/P EST MOD 30 MIN: CPT

## 2023-05-18 RX ORDER — EMPAGLIFLOZIN 10 MG/1
10 TABLET, FILM COATED ORAL
Qty: 30 | Refills: 0 | Status: DISCONTINUED | COMMUNITY
Start: 2023-04-09 | End: 2023-05-18

## 2023-05-18 NOTE — HISTORY OF PRESENT ILLNESS
[Diabetes Mellitus] : Diabetes Mellitus [FreeTextEntry6] : Pt is here for a follow up. [No episodes] : No hypoglycemic episodes since the last visit. [Check glucose ___ x/day] : Patient checks  blood glucose [unfilled]  times a day [] : sometimes in range [Regular podiatrist visits] : Patient did not have regular podiatrist visit [Understanding of foot care] : Patient expressed understanding of foot care

## 2023-11-15 ENCOUNTER — APPOINTMENT (OUTPATIENT)
Dept: FAMILY MEDICINE | Facility: CLINIC | Age: 48
End: 2023-11-15
Payer: COMMERCIAL

## 2023-11-15 DIAGNOSIS — L02.91 CUTANEOUS ABSCESS, UNSPECIFIED: ICD-10-CM

## 2023-11-15 PROCEDURE — 99213 OFFICE O/P EST LOW 20 MIN: CPT | Mod: 95

## 2024-01-04 ENCOUNTER — APPOINTMENT (OUTPATIENT)
Dept: FAMILY MEDICINE | Facility: CLINIC | Age: 49
End: 2024-01-04
Payer: COMMERCIAL

## 2024-01-04 VITALS
BODY MASS INDEX: 34.53 KG/M2 | OXYGEN SATURATION: 97 % | HEIGHT: 67 IN | HEART RATE: 103 BPM | WEIGHT: 220 LBS | SYSTOLIC BLOOD PRESSURE: 150 MMHG | TEMPERATURE: 98 F | DIASTOLIC BLOOD PRESSURE: 60 MMHG

## 2024-01-04 PROCEDURE — 99213 OFFICE O/P EST LOW 20 MIN: CPT

## 2024-03-21 RX ORDER — AMLODIPINE AND OLMESARTAN MEDOXOMIL 10; 40 MG/1; MG/1
10-40 TABLET ORAL DAILY
Qty: 90 | Refills: 0 | Status: ACTIVE | COMMUNITY
Start: 1900-01-01 | End: 1900-01-01

## 2024-03-28 ENCOUNTER — APPOINTMENT (OUTPATIENT)
Dept: FAMILY MEDICINE | Facility: CLINIC | Age: 49
End: 2024-03-28
Payer: COMMERCIAL

## 2024-03-28 VITALS
OXYGEN SATURATION: 97 % | WEIGHT: 220 LBS | TEMPERATURE: 98 F | HEART RATE: 101 BPM | BODY MASS INDEX: 34.46 KG/M2 | SYSTOLIC BLOOD PRESSURE: 152 MMHG | DIASTOLIC BLOOD PRESSURE: 64 MMHG

## 2024-03-28 DIAGNOSIS — E11.9 TYPE 2 DIABETES MELLITUS W/OUT COMPLICATIONS: ICD-10-CM

## 2024-03-28 PROCEDURE — 99214 OFFICE O/P EST MOD 30 MIN: CPT

## 2024-04-17 ENCOUNTER — APPOINTMENT (OUTPATIENT)
Dept: FAMILY MEDICINE | Facility: CLINIC | Age: 49
End: 2024-04-17
Payer: COMMERCIAL

## 2024-04-17 VITALS
DIASTOLIC BLOOD PRESSURE: 80 MMHG | WEIGHT: 243 LBS | HEIGHT: 67 IN | SYSTOLIC BLOOD PRESSURE: 142 MMHG | BODY MASS INDEX: 38.14 KG/M2 | HEART RATE: 70 BPM

## 2024-04-17 DIAGNOSIS — I10 ESSENTIAL (PRIMARY) HYPERTENSION: ICD-10-CM

## 2024-04-17 DIAGNOSIS — E66.9 OBESITY, UNSPECIFIED: ICD-10-CM

## 2024-04-17 PROCEDURE — 99213 OFFICE O/P EST LOW 20 MIN: CPT

## 2024-04-17 NOTE — HISTORY OF PRESENT ILLNESS
[Hypertension] : Hypertension [Obesity] : Obesity [FreeTextEntry6] : Pt states missed on 1 or 2 pills in the last 10 days

## 2024-04-17 NOTE — PLAN
[FreeTextEntry1] : Discussed adding HCTZ Pt wants to continue diet and exercise with weight loss Recheck in 3 weeks. If still elevated may need to add another agent.

## 2024-05-09 ENCOUNTER — APPOINTMENT (OUTPATIENT)
Dept: FAMILY MEDICINE | Facility: CLINIC | Age: 49
End: 2024-05-09

## 2024-06-07 NOTE — ED ADULT NURSE NOTE - OBJECTIVE STATEMENT
pt presents to ED c/o L groin abscess. states has been improving with abx ointment. pt hx htn, has not been taking bp meds for fear of increasing liver enzymes. pt states he has been dieting and exercising though has not addressed his salt intake. BP elevated upon arrival, repeat /82 w/ appropriately sized cuff Fall Risk pt presents to ED c/o L groin abscess. states has been improving with abx ointment. pt hx htn, has not been taking bp meds for fear of increasing liver enzymes. pt states he has been dieting and exercising though has not addressed his salt intake. BP elevated upon arrival, repeat /82 w/ appropriately sized cuff. denies cp.

## 2024-07-02 ENCOUNTER — APPOINTMENT (OUTPATIENT)
Dept: FAMILY MEDICINE | Facility: CLINIC | Age: 49
End: 2024-07-02
Payer: COMMERCIAL

## 2024-07-02 DIAGNOSIS — L73.2 HIDRADENITIS SUPPURATIVA: ICD-10-CM

## 2024-07-02 PROCEDURE — 99213 OFFICE O/P EST LOW 20 MIN: CPT

## 2024-07-02 RX ORDER — SULFAMETHOXAZOLE AND TRIMETHOPRIM 800; 160 MG/1; MG/1
800-160 TABLET ORAL TWICE DAILY
Qty: 14 | Refills: 0 | Status: ACTIVE | COMMUNITY
Start: 2024-07-02 | End: 1900-01-01

## 2024-07-18 RX ORDER — MV-MIN NO.83/IRON/FOLATE NO.10 20 MG-1670
TABLET ORAL DAILY
Qty: 90 | Refills: 0 | Status: ACTIVE | COMMUNITY
Start: 2024-07-18 | End: 1900-01-01

## 2024-07-30 ENCOUNTER — TRANSCRIPTION ENCOUNTER (OUTPATIENT)
Age: 49
End: 2024-07-30

## 2024-08-30 ENCOUNTER — RX RENEWAL (OUTPATIENT)
Age: 49
End: 2024-08-30

## 2024-12-12 ENCOUNTER — APPOINTMENT (OUTPATIENT)
Dept: FAMILY MEDICINE | Facility: CLINIC | Age: 49
End: 2024-12-12
Payer: COMMERCIAL

## 2024-12-12 ENCOUNTER — NON-APPOINTMENT (OUTPATIENT)
Age: 49
End: 2024-12-12

## 2024-12-12 VITALS
DIASTOLIC BLOOD PRESSURE: 80 MMHG | TEMPERATURE: 98 F | WEIGHT: 237 LBS | OXYGEN SATURATION: 97 % | HEART RATE: 89 BPM | BODY MASS INDEX: 37.2 KG/M2 | SYSTOLIC BLOOD PRESSURE: 150 MMHG | HEIGHT: 67 IN

## 2024-12-12 DIAGNOSIS — I10 ESSENTIAL (PRIMARY) HYPERTENSION: ICD-10-CM

## 2024-12-12 DIAGNOSIS — E66.9 OBESITY, UNSPECIFIED: ICD-10-CM

## 2024-12-12 DIAGNOSIS — Z00.00 ENCOUNTER FOR GENERAL ADULT MEDICAL EXAMINATION W/OUT ABNORMAL FINDINGS: ICD-10-CM

## 2024-12-12 DIAGNOSIS — E11.9 TYPE 2 DIABETES MELLITUS W/OUT COMPLICATIONS: ICD-10-CM

## 2024-12-12 PROCEDURE — 99396 PREV VISIT EST AGE 40-64: CPT

## 2024-12-12 PROCEDURE — 36415 COLL VENOUS BLD VENIPUNCTURE: CPT

## 2024-12-12 PROCEDURE — 93000 ELECTROCARDIOGRAM COMPLETE: CPT

## 2024-12-12 PROCEDURE — 81003 URINALYSIS AUTO W/O SCOPE: CPT | Mod: QW

## 2024-12-12 PROCEDURE — 92551 PURE TONE HEARING TEST AIR: CPT

## 2024-12-14 LAB
ALBUMIN SERPL ELPH-MCNC: 4.5 G/DL
ALP BLD-CCNC: 61 U/L
ALT SERPL-CCNC: 26 U/L
ANION GAP SERPL CALC-SCNC: 12 MMOL/L
AST SERPL-CCNC: 19 U/L
BASOPHILS # BLD AUTO: 0.1 K/UL
BASOPHILS NFR BLD AUTO: 1.1 %
BILIRUB SERPL-MCNC: 0.3 MG/DL
BUN SERPL-MCNC: 15 MG/DL
CALCIUM SERPL-MCNC: 9.5 MG/DL
CHLORIDE SERPL-SCNC: 105 MMOL/L
CHOLEST SERPL-MCNC: 168 MG/DL
CO2 SERPL-SCNC: 24 MMOL/L
CREAT SERPL-MCNC: 0.98 MG/DL
CREAT SPEC-SCNC: 142 MG/DL
EGFR: 95 ML/MIN/1.73M2
EOSINOPHIL # BLD AUTO: 0.37 K/UL
EOSINOPHIL NFR BLD AUTO: 4.1 %
ESTIMATED AVERAGE GLUCOSE: 160 MG/DL
GLUCOSE SERPL-MCNC: 104 MG/DL
HBA1C MFR BLD HPLC: 7.2 %
HCT VFR BLD CALC: 45.5 %
HDLC SERPL-MCNC: 35 MG/DL
HGB BLD-MCNC: 15.6 G/DL
IMM GRANULOCYTES NFR BLD AUTO: 0.2 %
LDLC SERPL CALC-MCNC: 112 MG/DL
LYMPHOCYTES # BLD AUTO: 2.94 K/UL
LYMPHOCYTES NFR BLD AUTO: 32.5 %
MAN DIFF?: NORMAL
MCHC RBC-ENTMCNC: 32.3 PG
MCHC RBC-ENTMCNC: 34.3 G/DL
MCV RBC AUTO: 94.2 FL
MICROALBUMIN 24H UR DL<=1MG/L-MCNC: 1.4 MG/DL
MICROALBUMIN/CREAT 24H UR-RTO: 10 MG/G
MONOCYTES # BLD AUTO: 0.85 K/UL
MONOCYTES NFR BLD AUTO: 9.4 %
NEUTROPHILS # BLD AUTO: 4.78 K/UL
NEUTROPHILS NFR BLD AUTO: 52.7 %
NONHDLC SERPL-MCNC: 134 MG/DL
PLATELET # BLD AUTO: 232 K/UL
POTASSIUM SERPL-SCNC: 4.3 MMOL/L
PROT SERPL-MCNC: 7.4 G/DL
PSA FREE FLD-MCNC: 35 %
PSA FREE SERPL-MCNC: 0.2 NG/ML
PSA SERPL-MCNC: 0.55 NG/ML
RBC # BLD: 4.83 M/UL
RBC # FLD: 13.2 %
SODIUM SERPL-SCNC: 140 MMOL/L
T3FREE SERPL-MCNC: 3.57 PG/ML
T4 FREE SERPL-MCNC: 1.1 NG/DL
TRIGL SERPL-MCNC: 117 MG/DL
TSH SERPL-ACNC: 1.63 UIU/ML
WBC # FLD AUTO: 9.06 K/UL

## 2024-12-18 LAB
BILIRUB UR QL STRIP: NORMAL
CLARITY UR: CLEAR
COLLECTION METHOD: NORMAL
GLUCOSE UR-MCNC: NORMAL
HCG UR QL: 0.2 EU/DL
HGB UR QL STRIP.AUTO: NORMAL
KETONES UR-MCNC: NORMAL
LEUKOCYTE ESTERASE UR QL STRIP: ABNORMAL
NITRITE UR QL STRIP: NORMAL
PH UR STRIP: 5.5
PROT UR STRIP-MCNC: NORMAL
SP GR UR STRIP: 1.02

## 2025-01-16 ENCOUNTER — APPOINTMENT (OUTPATIENT)
Dept: FAMILY MEDICINE | Facility: CLINIC | Age: 50
End: 2025-01-16
Payer: COMMERCIAL

## 2025-01-16 VITALS
WEIGHT: 236 LBS | DIASTOLIC BLOOD PRESSURE: 70 MMHG | SYSTOLIC BLOOD PRESSURE: 144 MMHG | BODY MASS INDEX: 37.04 KG/M2 | HEIGHT: 67 IN

## 2025-01-16 DIAGNOSIS — I10 ESSENTIAL (PRIMARY) HYPERTENSION: ICD-10-CM

## 2025-01-16 DIAGNOSIS — E11.9 TYPE 2 DIABETES MELLITUS W/OUT COMPLICATIONS: ICD-10-CM

## 2025-01-16 DIAGNOSIS — E66.9 OBESITY, UNSPECIFIED: ICD-10-CM

## 2025-01-16 PROCEDURE — 99213 OFFICE O/P EST LOW 20 MIN: CPT

## 2025-02-06 ENCOUNTER — APPOINTMENT (OUTPATIENT)
Dept: FAMILY MEDICINE | Facility: CLINIC | Age: 50
End: 2025-02-06
Payer: COMMERCIAL

## 2025-02-06 VITALS
HEIGHT: 67 IN | TEMPERATURE: 97.7 F | DIASTOLIC BLOOD PRESSURE: 62 MMHG | BODY MASS INDEX: 36.88 KG/M2 | OXYGEN SATURATION: 98 % | SYSTOLIC BLOOD PRESSURE: 122 MMHG | WEIGHT: 235 LBS | HEART RATE: 112 BPM

## 2025-02-06 DIAGNOSIS — Z86.69 PERSONAL HISTORY OF OTHER DISEASES OF THE NERVOUS SYSTEM AND SENSE ORGANS: ICD-10-CM

## 2025-02-06 DIAGNOSIS — E66.9 OBESITY, UNSPECIFIED: ICD-10-CM

## 2025-02-06 DIAGNOSIS — I10 ESSENTIAL (PRIMARY) HYPERTENSION: ICD-10-CM

## 2025-02-06 PROCEDURE — 99214 OFFICE O/P EST MOD 30 MIN: CPT

## 2025-02-06 RX ORDER — TIRZEPATIDE 2.5 MG/.5ML
2.5 INJECTION, SOLUTION SUBCUTANEOUS
Refills: 0 | Status: ACTIVE | COMMUNITY

## 2025-03-06 ENCOUNTER — APPOINTMENT (OUTPATIENT)
Dept: FAMILY MEDICINE | Facility: CLINIC | Age: 50
End: 2025-03-06
Payer: COMMERCIAL

## 2025-03-06 VITALS
OXYGEN SATURATION: 95 % | WEIGHT: 232 LBS | TEMPERATURE: 98.4 F | SYSTOLIC BLOOD PRESSURE: 122 MMHG | HEIGHT: 67 IN | DIASTOLIC BLOOD PRESSURE: 60 MMHG | HEART RATE: 102 BPM | BODY MASS INDEX: 36.41 KG/M2

## 2025-03-06 DIAGNOSIS — Z23 ENCOUNTER FOR IMMUNIZATION: ICD-10-CM

## 2025-03-06 DIAGNOSIS — E66.9 OBESITY, UNSPECIFIED: ICD-10-CM

## 2025-03-06 DIAGNOSIS — I10 ESSENTIAL (PRIMARY) HYPERTENSION: ICD-10-CM

## 2025-03-06 PROCEDURE — 90715 TDAP VACCINE 7 YRS/> IM: CPT

## 2025-03-06 PROCEDURE — 36415 COLL VENOUS BLD VENIPUNCTURE: CPT

## 2025-03-06 PROCEDURE — 90471 IMMUNIZATION ADMIN: CPT

## 2025-03-06 PROCEDURE — 99214 OFFICE O/P EST MOD 30 MIN: CPT | Mod: 25

## 2025-03-07 LAB
ALBUMIN SERPL ELPH-MCNC: 4.5 G/DL
ALP BLD-CCNC: 78 U/L
ALT SERPL-CCNC: 31 U/L
ANION GAP SERPL CALC-SCNC: 13 MMOL/L
AST SERPL-CCNC: 21 U/L
BILIRUB SERPL-MCNC: 0.4 MG/DL
BUN SERPL-MCNC: 14 MG/DL
CALCIUM SERPL-MCNC: 9.5 MG/DL
CHLORIDE SERPL-SCNC: 105 MMOL/L
CO2 SERPL-SCNC: 23 MMOL/L
CREAT SERPL-MCNC: 1.05 MG/DL
EGFRCR SERPLBLD CKD-EPI 2021: 87 ML/MIN/1.73M2
GLUCOSE SERPL-MCNC: 78 MG/DL
POTASSIUM SERPL-SCNC: 3.7 MMOL/L
PROT SERPL-MCNC: 7.4 G/DL
SODIUM SERPL-SCNC: 142 MMOL/L

## 2025-03-08 LAB
CHOLEST SERPL-MCNC: 165 MG/DL
HDLC SERPL-MCNC: 35 MG/DL
LDLC SERPL CALC-MCNC: 105 MG/DL
NONHDLC SERPL-MCNC: 131 MG/DL
TRIGL SERPL-MCNC: 141 MG/DL

## 2025-03-10 ENCOUNTER — APPOINTMENT (OUTPATIENT)
Dept: FAMILY MEDICINE | Facility: CLINIC | Age: 50
End: 2025-03-10
Payer: COMMERCIAL

## 2025-03-10 DIAGNOSIS — E11.9 TYPE 2 DIABETES MELLITUS W/OUT COMPLICATIONS: ICD-10-CM

## 2025-03-10 PROCEDURE — 99213 OFFICE O/P EST LOW 20 MIN: CPT | Mod: 95

## 2025-03-10 RX ORDER — MUPIROCIN 2 G/100G
2 CREAM TOPICAL TWICE DAILY
Qty: 1 | Refills: 1 | Status: ACTIVE | COMMUNITY
Start: 2025-03-10 | End: 1900-01-01

## 2025-03-13 ENCOUNTER — APPOINTMENT (OUTPATIENT)
Dept: FAMILY MEDICINE | Facility: CLINIC | Age: 50
End: 2025-03-13
Payer: COMMERCIAL

## 2025-03-13 VITALS
SYSTOLIC BLOOD PRESSURE: 138 MMHG | TEMPERATURE: 97.5 F | WEIGHT: 232 LBS | HEIGHT: 67 IN | BODY MASS INDEX: 36.41 KG/M2 | DIASTOLIC BLOOD PRESSURE: 80 MMHG | OXYGEN SATURATION: 96 % | HEART RATE: 98 BPM

## 2025-03-13 DIAGNOSIS — L02.91 CUTANEOUS ABSCESS, UNSPECIFIED: ICD-10-CM

## 2025-03-13 DIAGNOSIS — L73.2 HIDRADENITIS SUPPURATIVA: ICD-10-CM

## 2025-03-13 PROCEDURE — 99214 OFFICE O/P EST MOD 30 MIN: CPT

## 2025-03-13 PROCEDURE — 81003 URINALYSIS AUTO W/O SCOPE: CPT | Mod: QW

## 2025-03-13 RX ORDER — SULFAMETHOXAZOLE AND TRIMETHOPRIM 800; 160 MG/1; MG/1
800-160 TABLET ORAL TWICE DAILY
Qty: 14 | Refills: 0 | Status: ACTIVE | COMMUNITY
Start: 2025-03-13 | End: 1900-01-01

## 2025-03-14 LAB
BILIRUB UR QL STRIP: NORMAL
ESTIMATED AVERAGE GLUCOSE: 169 MG/DL
GLUCOSE UR-MCNC: NORMAL
HBA1C MFR BLD HPLC: 7.5 %
HCG UR QL: 0.2 EU/DL
HGB UR QL STRIP.AUTO: NORMAL
KETONES UR-MCNC: ABNORMAL
LEUKOCYTE ESTERASE UR QL STRIP: NORMAL
NITRITE UR QL STRIP: NORMAL
PH UR STRIP: 7
PROT UR STRIP-MCNC: ABNORMAL
SP GR UR STRIP: 1.02

## 2025-06-05 ENCOUNTER — APPOINTMENT (OUTPATIENT)
Dept: FAMILY MEDICINE | Facility: CLINIC | Age: 50
End: 2025-06-05
Payer: COMMERCIAL

## 2025-06-05 VITALS
BODY MASS INDEX: 36.34 KG/M2 | OXYGEN SATURATION: 96 % | DIASTOLIC BLOOD PRESSURE: 80 MMHG | HEART RATE: 99 BPM | TEMPERATURE: 97.6 F | SYSTOLIC BLOOD PRESSURE: 132 MMHG | WEIGHT: 232 LBS

## 2025-06-05 DIAGNOSIS — L73.2 HIDRADENITIS SUPPURATIVA: ICD-10-CM

## 2025-06-05 DIAGNOSIS — E11.9 TYPE 2 DIABETES MELLITUS W/OUT COMPLICATIONS: ICD-10-CM

## 2025-06-05 DIAGNOSIS — E66.9 OBESITY, UNSPECIFIED: ICD-10-CM

## 2025-06-05 DIAGNOSIS — I10 ESSENTIAL (PRIMARY) HYPERTENSION: ICD-10-CM

## 2025-06-05 PROCEDURE — 99214 OFFICE O/P EST MOD 30 MIN: CPT

## 2025-06-05 PROCEDURE — 36415 COLL VENOUS BLD VENIPUNCTURE: CPT

## 2025-06-05 PROCEDURE — 81003 URINALYSIS AUTO W/O SCOPE: CPT | Mod: QW

## 2025-06-06 LAB
ALBUMIN SERPL ELPH-MCNC: 4.4 G/DL
ALP BLD-CCNC: 75 U/L
ALT SERPL-CCNC: 31 U/L
ANION GAP SERPL CALC-SCNC: 12 MMOL/L
AST SERPL-CCNC: 22 U/L
BILIRUB SERPL-MCNC: 0.5 MG/DL
BILIRUB UR QL STRIP: NORMAL
BUN SERPL-MCNC: 13 MG/DL
CALCIUM SERPL-MCNC: 9.5 MG/DL
CHLORIDE SERPL-SCNC: 106 MMOL/L
CHOLEST SERPL-MCNC: 185 MG/DL
CO2 SERPL-SCNC: 25 MMOL/L
CREAT SERPL-MCNC: 1.06 MG/DL
CREAT SPEC-SCNC: 159 MG/DL
EGFRCR SERPLBLD CKD-EPI 2021: 86 ML/MIN/1.73M2
ESTIMATED AVERAGE GLUCOSE: 126 MG/DL
GLUCOSE SERPL-MCNC: 131 MG/DL
GLUCOSE UR-MCNC: NORMAL
HBA1C MFR BLD HPLC: 6 %
HCG UR QL: 0.2 EU/DL
HDLC SERPL-MCNC: 35 MG/DL
HGB UR QL STRIP.AUTO: NORMAL
KETONES UR-MCNC: NORMAL
LDLC SERPL-MCNC: 129 MG/DL
LEUKOCYTE ESTERASE UR QL STRIP: ABNORMAL
MICROALBUMIN 24H UR DL<=1MG/L-MCNC: 1.3 MG/DL
MICROALBUMIN/CREAT 24H UR-RTO: 8 MG/G
NITRITE UR QL STRIP: NORMAL
NONHDLC SERPL-MCNC: 150 MG/DL
PH UR STRIP: 7
POTASSIUM SERPL-SCNC: 4 MMOL/L
PROT SERPL-MCNC: 7.4 G/DL
PROT UR STRIP-MCNC: NORMAL
SODIUM SERPL-SCNC: 143 MMOL/L
SP GR UR STRIP: 1.02
TRIGL SERPL-MCNC: 115 MG/DL

## 2025-07-08 RX ORDER — MV-MIN NO.83/IRON/FOLATE NO.10 20 MG-1670
TABLET ORAL DAILY
Qty: 90 | Refills: 0 | Status: ACTIVE | COMMUNITY
Start: 2025-07-08 | End: 1900-01-01

## 2025-09-18 ENCOUNTER — APPOINTMENT (OUTPATIENT)
Dept: FAMILY MEDICINE | Facility: CLINIC | Age: 50
End: 2025-09-18
Payer: COMMERCIAL

## 2025-09-18 VITALS
HEIGHT: 67 IN | DIASTOLIC BLOOD PRESSURE: 70 MMHG | OXYGEN SATURATION: 96 % | WEIGHT: 215 LBS | TEMPERATURE: 97.7 F | HEART RATE: 94 BPM | SYSTOLIC BLOOD PRESSURE: 152 MMHG | BODY MASS INDEX: 33.74 KG/M2

## 2025-09-18 DIAGNOSIS — I10 ESSENTIAL (PRIMARY) HYPERTENSION: ICD-10-CM

## 2025-09-18 DIAGNOSIS — E11.9 TYPE 2 DIABETES MELLITUS W/OUT COMPLICATIONS: ICD-10-CM

## 2025-09-18 DIAGNOSIS — E66.9 OBESITY, UNSPECIFIED: ICD-10-CM

## 2025-09-18 PROCEDURE — 36415 COLL VENOUS BLD VENIPUNCTURE: CPT

## 2025-09-18 PROCEDURE — 99214 OFFICE O/P EST MOD 30 MIN: CPT

## 2025-09-19 ENCOUNTER — TRANSCRIPTION ENCOUNTER (OUTPATIENT)
Age: 50
End: 2025-09-19

## 2025-09-19 LAB
ALBUMIN SERPL ELPH-MCNC: 4.6 G/DL
ALP BLD-CCNC: 78 U/L
ALT SERPL-CCNC: 32 U/L
ANION GAP SERPL CALC-SCNC: 14 MMOL/L
AST SERPL-CCNC: 22 U/L
BASOPHILS # BLD AUTO: 0.09 K/UL
BASOPHILS NFR BLD AUTO: 1 %
BILIRUB SERPL-MCNC: 0.6 MG/DL
BUN SERPL-MCNC: 12 MG/DL
CALCIUM SERPL-MCNC: 9.7 MG/DL
CHLORIDE SERPL-SCNC: 107 MMOL/L
CHOLEST SERPL-MCNC: 161 MG/DL
CO2 SERPL-SCNC: 23 MMOL/L
CREAT SERPL-MCNC: 0.96 MG/DL
CRP SERPL-MCNC: 3 MG/L
EGFRCR SERPLBLD CKD-EPI 2021: 96 ML/MIN/1.73M2
EOSINOPHIL # BLD AUTO: 0.29 K/UL
EOSINOPHIL NFR BLD AUTO: 3.4 %
ESTIMATED AVERAGE GLUCOSE: 128 MG/DL
GLUCOSE SERPL-MCNC: 124 MG/DL
HBA1C MFR BLD HPLC: 6.1 %
HCT VFR BLD CALC: 43.7 %
HDLC SERPL-MCNC: 33 MG/DL
HGB BLD-MCNC: 15.1 G/DL
IMM GRANULOCYTES NFR BLD AUTO: 0.3 %
LDLC SERPL-MCNC: 108 MG/DL
LYMPHOCYTES # BLD AUTO: 2.41 K/UL
LYMPHOCYTES NFR BLD AUTO: 28.1 %
MAN DIFF?: NORMAL
MCHC RBC-ENTMCNC: 32.5 PG
MCHC RBC-ENTMCNC: 34.6 G/DL
MCV RBC AUTO: 94.2 FL
MONOCYTES # BLD AUTO: 0.83 K/UL
MONOCYTES NFR BLD AUTO: 9.7 %
NEUTROPHILS # BLD AUTO: 4.94 K/UL
NEUTROPHILS NFR BLD AUTO: 57.5 %
NONHDLC SERPL-MCNC: 128 MG/DL
PLATELET # BLD AUTO: 224 K/UL
POTASSIUM SERPL-SCNC: 3.5 MMOL/L
PROT SERPL-MCNC: 7.4 G/DL
RBC # BLD: 4.64 M/UL
RBC # FLD: 13.9 %
SODIUM SERPL-SCNC: 144 MMOL/L
TRIGL SERPL-MCNC: 107 MG/DL
WBC # FLD AUTO: 8.59 K/UL